# Patient Record
Sex: FEMALE | Race: WHITE | NOT HISPANIC OR LATINO | URBAN - METROPOLITAN AREA
[De-identification: names, ages, dates, MRNs, and addresses within clinical notes are randomized per-mention and may not be internally consistent; named-entity substitution may affect disease eponyms.]

---

## 2017-01-26 ENCOUNTER — OUTPATIENT (OUTPATIENT)
Dept: OUTPATIENT SERVICES | Facility: HOSPITAL | Age: 52
LOS: 1 days | End: 2017-01-26
Payer: COMMERCIAL

## 2017-01-26 DIAGNOSIS — M47.812 SPONDYLOSIS WITHOUT MYELOPATHY OR RADICULOPATHY, CERVICAL REGION: ICD-10-CM

## 2017-01-26 PROCEDURE — 64492 INJ PARAVERT F JNT C/T 3 LEV: CPT | Mod: RT

## 2017-01-26 PROCEDURE — 64490 INJ PARAVERT F JNT C/T 1 LEV: CPT | Mod: RT

## 2017-01-26 PROCEDURE — 64491 INJ PARAVERT F JNT C/T 2 LEV: CPT | Mod: RT

## 2017-01-26 PROCEDURE — 77003 FLUOROGUIDE FOR SPINE INJECT: CPT

## 2017-04-04 ENCOUNTER — OUTPATIENT (OUTPATIENT)
Dept: OUTPATIENT SERVICES | Facility: HOSPITAL | Age: 52
LOS: 1 days | End: 2017-04-04
Payer: COMMERCIAL

## 2017-04-04 DIAGNOSIS — M47.817 SPONDYLOSIS WITHOUT MYELOPATHY OR RADICULOPATHY, LUMBOSACRAL REGION: ICD-10-CM

## 2017-04-04 PROCEDURE — 77003 FLUOROGUIDE FOR SPINE INJECT: CPT

## 2017-04-04 PROCEDURE — 64633 DESTROY CERV/THOR FACET JNT: CPT | Mod: RT

## 2017-04-04 PROCEDURE — 64634 DESTROY C/TH FACET JNT ADDL: CPT | Mod: RT

## 2017-11-06 ENCOUNTER — OUTPATIENT (OUTPATIENT)
Dept: OUTPATIENT SERVICES | Facility: HOSPITAL | Age: 52
LOS: 1 days | End: 2017-11-06
Payer: COMMERCIAL

## 2017-11-06 DIAGNOSIS — M47.812 SPONDYLOSIS WITHOUT MYELOPATHY OR RADICULOPATHY, CERVICAL REGION: ICD-10-CM

## 2017-11-06 PROCEDURE — 77003 FLUOROGUIDE FOR SPINE INJECT: CPT

## 2017-11-06 PROCEDURE — 64633 DESTROY CERV/THOR FACET JNT: CPT | Mod: RT

## 2017-11-06 PROCEDURE — 64634 DESTROY C/TH FACET JNT ADDL: CPT | Mod: RT

## 2017-11-14 ENCOUNTER — OUTPATIENT (OUTPATIENT)
Dept: OUTPATIENT SERVICES | Facility: HOSPITAL | Age: 52
LOS: 1 days | End: 2017-11-14
Payer: COMMERCIAL

## 2017-11-14 DIAGNOSIS — M47.812 SPONDYLOSIS WITHOUT MYELOPATHY OR RADICULOPATHY, CERVICAL REGION: ICD-10-CM

## 2017-11-14 PROCEDURE — 64633 DESTROY CERV/THOR FACET JNT: CPT | Mod: LT

## 2017-11-14 PROCEDURE — 77003 FLUOROGUIDE FOR SPINE INJECT: CPT

## 2017-11-14 PROCEDURE — 64634 DESTROY C/TH FACET JNT ADDL: CPT | Mod: LT

## 2019-04-11 ENCOUNTER — OUTPATIENT (OUTPATIENT)
Dept: OUTPATIENT SERVICES | Facility: HOSPITAL | Age: 54
LOS: 1 days | Discharge: HOME | End: 2019-04-11
Payer: COMMERCIAL

## 2019-04-11 DIAGNOSIS — M47.817 SPONDYLOSIS WITHOUT MYELOPATHY OR RADICULOPATHY, LUMBOSACRAL REGION: ICD-10-CM

## 2019-04-11 PROCEDURE — 64492 INJ PARAVERT F JNT C/T 3 LEV: CPT | Mod: RT

## 2019-04-11 PROCEDURE — 77003 FLUOROGUIDE FOR SPINE INJECT: CPT

## 2019-04-11 PROCEDURE — 64490 INJ PARAVERT F JNT C/T 1 LEV: CPT | Mod: RT

## 2019-04-11 PROCEDURE — 64491 INJ PARAVERT F JNT C/T 2 LEV: CPT | Mod: RT

## 2019-04-18 ENCOUNTER — OUTPATIENT (OUTPATIENT)
Dept: OUTPATIENT SERVICES | Facility: HOSPITAL | Age: 54
LOS: 1 days | End: 2019-04-18
Payer: COMMERCIAL

## 2019-04-18 DIAGNOSIS — M47.817 SPONDYLOSIS WITHOUT MYELOPATHY OR RADICULOPATHY, LUMBOSACRAL REGION: ICD-10-CM

## 2019-04-18 PROCEDURE — 64492 INJ PARAVERT F JNT C/T 3 LEV: CPT | Mod: LT

## 2019-04-18 PROCEDURE — 64490 INJ PARAVERT F JNT C/T 1 LEV: CPT | Mod: LT

## 2019-04-18 PROCEDURE — 77003 FLUOROGUIDE FOR SPINE INJECT: CPT

## 2019-04-18 PROCEDURE — 64491 INJ PARAVERT F JNT C/T 2 LEV: CPT | Mod: LT

## 2019-06-13 ENCOUNTER — OUTPATIENT (OUTPATIENT)
Dept: OUTPATIENT SERVICES | Facility: HOSPITAL | Age: 54
LOS: 1 days | Discharge: HOME | End: 2019-06-13
Payer: COMMERCIAL

## 2019-06-13 DIAGNOSIS — M47.812 SPONDYLOSIS WITHOUT MYELOPATHY OR RADICULOPATHY, CERVICAL REGION: ICD-10-CM

## 2019-06-13 PROCEDURE — 64634 DESTROY C/TH FACET JNT ADDL: CPT | Mod: LT

## 2019-06-13 PROCEDURE — 64633 DESTROY CERV/THOR FACET JNT: CPT | Mod: LT

## 2019-06-13 PROCEDURE — 77003 FLUOROGUIDE FOR SPINE INJECT: CPT

## 2019-06-20 ENCOUNTER — OUTPATIENT (OUTPATIENT)
Dept: OUTPATIENT SERVICES | Facility: HOSPITAL | Age: 54
LOS: 1 days | Discharge: HOME | End: 2019-06-20
Payer: COMMERCIAL

## 2019-06-20 DIAGNOSIS — M47.817 SPONDYLOSIS WITHOUT MYELOPATHY OR RADICULOPATHY, LUMBOSACRAL REGION: ICD-10-CM

## 2019-06-20 DIAGNOSIS — M47.812 SPONDYLOSIS WITHOUT MYELOPATHY OR RADICULOPATHY, CERVICAL REGION: ICD-10-CM

## 2019-06-20 PROCEDURE — 77003 FLUOROGUIDE FOR SPINE INJECT: CPT

## 2019-06-20 PROCEDURE — 64634 DESTROY C/TH FACET JNT ADDL: CPT | Mod: RT

## 2019-06-20 PROCEDURE — 64633 DESTROY CERV/THOR FACET JNT: CPT | Mod: RT

## 2020-05-06 ENCOUNTER — OUTPATIENT (OUTPATIENT)
Dept: OUTPATIENT SERVICES | Facility: HOSPITAL | Age: 55
LOS: 1 days | End: 2020-05-06
Payer: COMMERCIAL

## 2020-05-06 DIAGNOSIS — M47.812 SPONDYLOSIS WITHOUT MYELOPATHY OR RADICULOPATHY, CERVICAL REGION: ICD-10-CM

## 2020-05-06 LAB — SARS-COV-2 RNA SPEC QL NAA+PROBE: SIGNIFICANT CHANGE UP

## 2020-05-06 PROCEDURE — 87635 SARS-COV-2 COVID-19 AMP PRB: CPT

## 2020-05-07 ENCOUNTER — OUTPATIENT (OUTPATIENT)
Dept: OUTPATIENT SERVICES | Facility: HOSPITAL | Age: 55
LOS: 1 days | End: 2020-05-07
Payer: COMMERCIAL

## 2020-05-07 DIAGNOSIS — M47.812 SPONDYLOSIS WITHOUT MYELOPATHY OR RADICULOPATHY, CERVICAL REGION: ICD-10-CM

## 2020-05-07 PROCEDURE — 64634 DESTROY C/TH FACET JNT ADDL: CPT | Mod: LT

## 2020-05-07 PROCEDURE — 64633 DESTROY CERV/THOR FACET JNT: CPT | Mod: LT

## 2020-05-07 PROCEDURE — 77003 FLUOROGUIDE FOR SPINE INJECT: CPT

## 2020-05-08 PROBLEM — Z00.00 ENCOUNTER FOR PREVENTIVE HEALTH EXAMINATION: Status: ACTIVE | Noted: 2020-05-08

## 2020-05-12 ENCOUNTER — APPOINTMENT (OUTPATIENT)
Dept: RHEUMATOLOGY | Facility: CLINIC | Age: 55
End: 2020-05-12
Payer: COMMERCIAL

## 2020-05-12 VITALS
OXYGEN SATURATION: 98 % | BODY MASS INDEX: 20.66 KG/M2 | HEART RATE: 71 BPM | SYSTOLIC BLOOD PRESSURE: 100 MMHG | HEIGHT: 64 IN | TEMPERATURE: 98.4 F | WEIGHT: 121 LBS | DIASTOLIC BLOOD PRESSURE: 68 MMHG

## 2020-05-12 DIAGNOSIS — M47.819 SPONDYLOSIS W/OUT MYELOPATHY OR RADICULOPATHY, SITE UNSPECIFIED: ICD-10-CM

## 2020-05-12 DIAGNOSIS — M79.7 FIBROMYALGIA: ICD-10-CM

## 2020-05-12 DIAGNOSIS — M19.90 UNSPECIFIED OSTEOARTHRITIS, UNSPECIFIED SITE: ICD-10-CM

## 2020-05-12 DIAGNOSIS — Z82.61 FAMILY HISTORY OF ARTHRITIS: ICD-10-CM

## 2020-05-12 PROCEDURE — 99203 OFFICE O/P NEW LOW 30 MIN: CPT | Mod: 25

## 2020-05-12 PROCEDURE — 36415 COLL VENOUS BLD VENIPUNCTURE: CPT

## 2020-05-12 NOTE — HISTORY OF PRESENT ILLNESS
[FreeTextEntry1] : Initial HPI 5/12/20 \par 53 yo wf with long hx chronic pain s/p LS L5-S1 anteriorfusion 2000, and chronic neck issues following PM.  Hx significant for frozen shoulders x 2 in past can't ID any triggering.. \par Currently on gabapentin 800 mg TID, Baclofen 10 mg BID, Valium 5 mg 1-2 day Trazodone 100 mg and MM (CBD in daytime/ THC at HS)... started 3 ys ago.. \par \par At this point overwhelming fatigue\par Sleep:  some difficulty staying asleep and only wakes once...hx ambien for awhile but no longer necessary... better w/ MM... , pain never goes away, functionally able to care for family.. routine stretching/ and walking routinely 30 mins.. able to be active w/ friends/ family.. .. \par Denies depression or anxiety.. and assessment negative \par Would like to not add any medications...\par \par Followed closely by Dr. Liriano x many ys, w/u for RA in past negative, pelvic films with minimal DJD and no evidence of erosive changes.   \par \par Lives with chronic severe muscle spams. \par Past tx:\par Acupuncture, PT \par Opiates not helpful- \par Massage\par \par Obesity:  in past max 210 now at 121 with excellent motion\par Lyrica not effective \par Wellbutrin\par Metaxalone\par Flexeril\par Tizanidine\par no amitripyline, savella,

## 2020-05-12 NOTE — CONSULT LETTER
[Dear  ___] : Dear  [unfilled], [Consult Letter:] : I had the pleasure of evaluating your patient, [unfilled]. [Consult Closing:] : Thank you very much for allowing me to participate in the care of this patient.  If you have any questions, please do not hesitate to contact me. [Sincerely,] : Sincerely, [FreeTextEntry2] : Loretta Cowan DO\par [address] [facility], [address]\[place] [address] \par [phone]\par \par  [FreeTextEntry1] : Please see below for summary of  recent rheumatology evaluation and recommendations.\par \par 53 yo wf with long hx chronic pain s/p LS L5-S1 anteriorfusion 2000, and chronic neck issues following PM- eventually dx w/ FM..\par \par 1) FM:  long standing.. w/ .  Hx significant for frozen shoulders x 2 in past can't ID any triggering.. Followed cy Dr coppola \par Currently on gabapentin 800 mg TID, Baclofen 10 mg BID, Valium 5 mg 1-2 day Trazodone 100 mg and MM (CBD in daytime/ THC at HS)... started 3 ys ago.. Overall wants to minimize medications as much as possible. \par Little on exam to suggest inflammatory process but will do labs/ and SI xrays  \par NOTE: \par - EMG/ NCS + CTS otherwise negative \par - previous labs negative for systemic inflammatory process.. last sepulveda 2018\par Treatments in past\par Lyrica not effective \par Wellbutrin\par Metaxalone\par Flexeril\par Tizanidine\par no amitripyline, savella\par Past tx:\par Acupuncture, PT \par Opiates not helpful- \par Massage\par \par 2) Obesity:  in past max 210 now at 121 with excellent motion\par \par Plan: \par - labs now\par - may consider Cymbalta or other SSRI/ SNRI... for pain issues \par ,  [FreeTextEntry3] : Bridgette Calderon DNP, ANP-C\par Division or Rheumatology\par [facility]\par \par

## 2020-05-12 NOTE — PHYSICAL EXAM
[General Appearance - Alert] : alert [General Appearance - In No Acute Distress] : in no acute distress [General Appearance - Well Nourished] : well nourished [General Appearance - Well-Appearing] : healthy appearing [General Appearance - Well Developed] : well developed [Sclera] : the sclera and conjunctiva were normal [PERRL With Normal Accommodation] : pupils were equal in size, round, and reactive to light [Extraocular Movements] : extraocular movements were intact [Oropharynx] : the oropharynx was normal [Outer Ear] : the ears and nose were normal in appearance [Neck Appearance] : the appearance of the neck was normal [Neck Cervical Mass (___cm)] : no neck mass was observed [Jugular Venous Distention Increased] : there was no jugular-venous distention [Thyroid Diffuse Enlargement] : the thyroid was not enlarged [Thyroid Nodule] : there were no palpable thyroid nodules [Auscultation Breath Sounds / Voice Sounds] : lungs were clear to auscultation bilaterally [Heart Rate And Rhythm] : heart rate was normal and rhythm regular [Heart Sounds] : normal S1 and S2 [Heart Sounds Gallop] : no gallops [Murmurs] : no murmurs [Heart Sounds Pericardial Friction Rub] : no pericardial rub [Full Pulse] : the pedal pulses are present [Bowel Sounds] : normal bowel sounds [Edema] : there was no peripheral edema [Abdomen Soft] : soft [Abdomen Tenderness] : non-tender [Abdomen Mass (___ Cm)] : no abdominal mass palpated [Cervical Lymph Nodes Enlarged Posterior Bilaterally] : posterior cervical [Cervical Lymph Nodes Enlarged Anterior Bilaterally] : anterior cervical [Supraclavicular Lymph Nodes Enlarged Bilaterally] : supraclavicular [Axillary Lymph Nodes Enlarged Bilaterally] : axillary [Femoral Lymph Nodes Enlarged Bilaterally] : femoral [Inguinal Lymph Nodes Enlarged Bilaterally] : inguinal [No CVA Tenderness] : no ~M costovertebral angle tenderness [No Spinal Tenderness] : no spinal tenderness [Abnormal Walk] : normal gait [Nail Clubbing] : no clubbing  or cyanosis of the fingernails [Musculoskeletal - Swelling] : no joint swelling seen [Motor Tone] : muscle strength and tone were normal [] : no rash [Sensation] : the sensory exam was normal to light touch and pinprick [Motor Exam] : the motor exam was normal [FreeTextEntry1] : PSD 21+ for FM, PSD 6 mild , CHALO- 1 no anxiety

## 2020-05-12 NOTE — ASSESSMENT
[FreeTextEntry1] : \par 55 yo wf with long hx chronic pain s/p LS L5-S1 anteriorfusion 2000, and chronic neck issues following PM- eventually dx w/ FM..\par \par 1) FM:  long standing.. w/ .  Hx significant for frozen shoulders x 2 in past can't ID any triggering.. Followed cy Dr coppola \par Currently on gabapentin 800 mg TID, Baclofen 10 mg BID, Valium 5 mg 1-2 day Trazodone 100 mg and MM (CBD in daytime/ THC at HS)... started 3 ys ago.. Overall wants to minimize medications as much as possible. \par Little on exam to suggest inflammatory process but will do labs/ and SI xrays  \par NOTE: \par - EMG/ NCS + CTS otherwise negative \par - previous labs negative for systemic inflammatory process.. last sepulveda 2018\par Treatments in past\par Lyrica not effective \par Wellbutrin\par Metaxalone\par Flexeril\par Tizanidine\par no amitripyline, savella\par Past tx:\par Acupuncture, PT \par Opiates not helpful- \par Massage\par \par 2) Obesity:  in past max 210 now at 121 with excellent motion\par \par Plan: \par - labs now\par - may consider Cymbalta or other SSRI/ SNRI... for pain issues \par - recommend continued current tx.. with no change\par - xray SI jointts and labs now

## 2020-05-12 NOTE — REVIEW OF SYSTEMS
[Feeling Tired] : feeling tired [Diarrhea] : diarrhea [Arthralgias] : arthralgias [Joint Pain] : joint pain [As Noted in HPI] : as noted in HPI [Negative] : Heme/Lymph [FreeTextEntry2] : nearly always fatigued..  [FreeTextEntry9] : no overt joint swelling now or in past.  Known hypermobility x many ys but no joint dislocation

## 2020-05-12 NOTE — REASON FOR VISIT
[Consultation] : a consultation visit [FreeTextEntry1] : Chronic pain x many ys dx FM, here to r/o underlying inflammatory process

## 2020-05-13 LAB
25(OH)D3 SERPL-MCNC: 35.2 NG/ML
ALBUMIN SERPL ELPH-MCNC: 4.6 G/DL
ALP BLD-CCNC: 51 U/L
ALT SERPL-CCNC: 15 U/L
ANION GAP SERPL CALC-SCNC: 15 MMOL/L
AST SERPL-CCNC: 20 U/L
BASOPHILS # BLD AUTO: 0.04 K/UL
BASOPHILS NFR BLD AUTO: 0.7 %
BILIRUB SERPL-MCNC: 0.4 MG/DL
BUN SERPL-MCNC: 11 MG/DL
CALCIUM SERPL-MCNC: 9.5 MG/DL
CALCIUM SERPL-MCNC: 9.5 MG/DL
CHLORIDE SERPL-SCNC: 101 MMOL/L
CK SERPL-CCNC: 44 U/L
CO2 SERPL-SCNC: 28 MMOL/L
CREAT SERPL-MCNC: 0.7 MG/DL
CRP SERPL-MCNC: <0.1 MG/DL
EOSINOPHIL # BLD AUTO: 0.04 K/UL
EOSINOPHIL NFR BLD AUTO: 0.7 %
ERYTHROCYTE [SEDIMENTATION RATE] IN BLOOD BY WESTERGREN METHOD: 20 MM/HR
GLUCOSE SERPL-MCNC: 85 MG/DL
HCT VFR BLD CALC: 43.4 %
HGB BLD-MCNC: 13.8 G/DL
IMM GRANULOCYTES NFR BLD AUTO: 0.2 %
LYMPHOCYTES # BLD AUTO: 2.01 K/UL
LYMPHOCYTES NFR BLD AUTO: 34.5 %
MAN DIFF?: NORMAL
MCHC RBC-ENTMCNC: 29.2 PG
MCHC RBC-ENTMCNC: 31.8 GM/DL
MCV RBC AUTO: 91.8 FL
MONOCYTES # BLD AUTO: 0.48 K/UL
MONOCYTES NFR BLD AUTO: 8.2 %
NEUTROPHILS # BLD AUTO: 3.25 K/UL
NEUTROPHILS NFR BLD AUTO: 55.7 %
PARATHYROID HORMONE INTACT: 24 PG/ML
PLATELET # BLD AUTO: 283 K/UL
POTASSIUM SERPL-SCNC: 3.8 MMOL/L
PROT SERPL-MCNC: 7.6 G/DL
RBC # BLD: 4.73 M/UL
RBC # FLD: 12.6 %
SODIUM SERPL-SCNC: 143 MMOL/L
TSH SERPL-ACNC: 1.71 UIU/ML
WBC # FLD AUTO: 5.83 K/UL

## 2020-07-14 ENCOUNTER — OUTPATIENT (OUTPATIENT)
Dept: OUTPATIENT SERVICES | Facility: HOSPITAL | Age: 55
LOS: 1 days | End: 2020-07-14
Payer: COMMERCIAL

## 2020-07-14 DIAGNOSIS — Z11.59 ENCOUNTER FOR SCREENING FOR OTHER VIRAL DISEASES: ICD-10-CM

## 2020-07-14 PROCEDURE — U0003: CPT

## 2020-07-15 LAB — SARS-COV-2 RNA SPEC QL NAA+PROBE: SIGNIFICANT CHANGE UP

## 2020-07-16 ENCOUNTER — OUTPATIENT (OUTPATIENT)
Dept: OUTPATIENT SERVICES | Facility: HOSPITAL | Age: 55
LOS: 1 days | End: 2020-07-16
Payer: COMMERCIAL

## 2020-07-16 DIAGNOSIS — M47.812 SPONDYLOSIS WITHOUT MYELOPATHY OR RADICULOPATHY, CERVICAL REGION: ICD-10-CM

## 2020-07-16 PROCEDURE — 64634 DESTROY C/TH FACET JNT ADDL: CPT | Mod: RT

## 2020-07-16 PROCEDURE — 77003 FLUOROGUIDE FOR SPINE INJECT: CPT

## 2020-07-16 PROCEDURE — 64633 DESTROY CERV/THOR FACET JNT: CPT | Mod: RT

## 2021-03-03 ENCOUNTER — APPOINTMENT (OUTPATIENT)
Dept: GASTROENTEROLOGY | Facility: CLINIC | Age: 56
End: 2021-03-03
Payer: COMMERCIAL

## 2021-03-03 VITALS
HEIGHT: 64 IN | BODY MASS INDEX: 20.49 KG/M2 | TEMPERATURE: 98 F | DIASTOLIC BLOOD PRESSURE: 93 MMHG | WEIGHT: 120 LBS | SYSTOLIC BLOOD PRESSURE: 128 MMHG | HEART RATE: 102 BPM

## 2021-03-03 DIAGNOSIS — Z87.19 PERSONAL HISTORY OF OTHER DISEASES OF THE DIGESTIVE SYSTEM: ICD-10-CM

## 2021-03-03 DIAGNOSIS — K21.9 GASTRO-ESOPHAGEAL REFLUX DISEASE W/OUT ESOPHAGITIS: ICD-10-CM

## 2021-03-03 PROCEDURE — 99203 OFFICE O/P NEW LOW 30 MIN: CPT

## 2021-03-03 PROCEDURE — 99072 ADDL SUPL MATRL&STAF TM PHE: CPT

## 2021-03-03 RX ORDER — SODIUM PICOSULFATE, MAGNESIUM OXIDE, AND ANHYDROUS CITRIC ACID 10; 3.5; 12 MG/160ML; G/160ML; G/160ML
10-3.5-12 MG-GM LIQUID ORAL
Qty: 1 | Refills: 0 | Status: ACTIVE | COMMUNITY
Start: 2021-03-03 | End: 1900-01-01

## 2021-03-03 NOTE — ASSESSMENT
[FreeTextEntry1] : 56 yo female with history of GERD symptoms and history of IBS with rectal bleeding. Will arrange for colonoscopy and upper endoscopy to evaluate.

## 2021-03-03 NOTE — HISTORY OF PRESENT ILLNESS
[de-identified] : Ms. MARCELLUS KAT is a 55 year old female with history of bloating associated with alternating constipation and diarrhea. Patient notes that bloating is progressive and worse at the end of the day. Patient did have an episode of rectal bleeding 2 weeks ago. Patient never had prior screening. There is no family history of colon cancer. Patient has also noted frequent heartburn for which she has taken multiple courses of proton pump inhibitors. There's been no dysphagia.\par

## 2021-03-30 ENCOUNTER — APPOINTMENT (OUTPATIENT)
Dept: DISASTER EMERGENCY | Facility: CLINIC | Age: 56
End: 2021-03-30

## 2021-03-30 DIAGNOSIS — Z01.818 ENCOUNTER FOR OTHER PREPROCEDURAL EXAMINATION: ICD-10-CM

## 2021-03-31 LAB — SARS-COV-2 N GENE NPH QL NAA+PROBE: NOT DETECTED

## 2021-04-02 ENCOUNTER — RESULT REVIEW (OUTPATIENT)
Age: 56
End: 2021-04-02

## 2021-04-02 ENCOUNTER — APPOINTMENT (OUTPATIENT)
Dept: GASTROENTEROLOGY | Facility: AMBULATORY MEDICAL SERVICES | Age: 56
End: 2021-04-02
Payer: COMMERCIAL

## 2021-04-02 PROCEDURE — 43239 EGD BIOPSY SINGLE/MULTIPLE: CPT

## 2021-04-02 PROCEDURE — 45385 COLONOSCOPY W/LESION REMOVAL: CPT

## 2021-12-21 ENCOUNTER — OUTPATIENT (OUTPATIENT)
Dept: OUTPATIENT SERVICES | Facility: HOSPITAL | Age: 56
LOS: 1 days | End: 2021-12-21
Payer: COMMERCIAL

## 2021-12-21 DIAGNOSIS — Z20.828 CONTACT WITH AND (SUSPECTED) EXPOSURE TO OTHER VIRAL COMMUNICABLE DISEASES: ICD-10-CM

## 2021-12-21 LAB — SARS-COV-2 RNA SPEC QL NAA+PROBE: SIGNIFICANT CHANGE UP

## 2021-12-21 PROCEDURE — U0005: CPT

## 2021-12-21 PROCEDURE — U0003: CPT

## 2021-12-23 ENCOUNTER — OUTPATIENT (OUTPATIENT)
Dept: OUTPATIENT SERVICES | Facility: HOSPITAL | Age: 56
LOS: 1 days | End: 2021-12-23
Payer: COMMERCIAL

## 2021-12-23 DIAGNOSIS — M54.14 RADICULOPATHY, THORACIC REGION: ICD-10-CM

## 2021-12-23 PROCEDURE — 64480 NJX AA&/STRD TFRM EPI C/T EA: CPT | Mod: RT

## 2021-12-23 PROCEDURE — 64479 NJX AA&/STRD TFRM EPI C/T 1: CPT | Mod: RT

## 2022-05-31 ENCOUNTER — OUTPATIENT (OUTPATIENT)
Dept: OUTPATIENT SERVICES | Facility: HOSPITAL | Age: 57
LOS: 1 days | End: 2022-05-31
Payer: COMMERCIAL

## 2022-05-31 DIAGNOSIS — Z20.828 CONTACT WITH AND (SUSPECTED) EXPOSURE TO OTHER VIRAL COMMUNICABLE DISEASES: ICD-10-CM

## 2022-05-31 LAB — SARS-COV-2 RNA SPEC QL NAA+PROBE: SIGNIFICANT CHANGE UP

## 2022-05-31 PROCEDURE — U0005: CPT

## 2022-05-31 PROCEDURE — U0003: CPT

## 2022-06-02 ENCOUNTER — OUTPATIENT (OUTPATIENT)
Dept: OUTPATIENT SERVICES | Facility: HOSPITAL | Age: 57
LOS: 1 days | End: 2022-06-02
Payer: COMMERCIAL

## 2022-06-02 DIAGNOSIS — M47.812 SPONDYLOSIS WITHOUT MYELOPATHY OR RADICULOPATHY, CERVICAL REGION: ICD-10-CM

## 2022-06-02 PROCEDURE — 64490 INJ PARAVERT F JNT C/T 1 LEV: CPT | Mod: LT

## 2022-06-02 PROCEDURE — 64491 INJ PARAVERT F JNT C/T 2 LEV: CPT | Mod: LT

## 2022-06-02 PROCEDURE — 64492 INJ PARAVERT F JNT C/T 3 LEV: CPT | Mod: LT

## 2022-06-10 ENCOUNTER — OUTPATIENT (OUTPATIENT)
Dept: OUTPATIENT SERVICES | Facility: HOSPITAL | Age: 57
LOS: 1 days | End: 2022-06-10
Payer: COMMERCIAL

## 2022-06-10 DIAGNOSIS — Z20.828 CONTACT WITH AND (SUSPECTED) EXPOSURE TO OTHER VIRAL COMMUNICABLE DISEASES: ICD-10-CM

## 2022-06-10 LAB — SARS-COV-2 RNA SPEC QL NAA+PROBE: SIGNIFICANT CHANGE UP

## 2022-06-10 PROCEDURE — U0005: CPT

## 2022-06-10 PROCEDURE — U0003: CPT

## 2022-06-13 ENCOUNTER — OUTPATIENT (OUTPATIENT)
Dept: OUTPATIENT SERVICES | Facility: HOSPITAL | Age: 57
LOS: 1 days | End: 2022-06-13
Payer: COMMERCIAL

## 2022-06-13 DIAGNOSIS — M47.812 SPONDYLOSIS WITHOUT MYELOPATHY OR RADICULOPATHY, CERVICAL REGION: ICD-10-CM

## 2022-06-13 PROCEDURE — 64492 INJ PARAVERT F JNT C/T 3 LEV: CPT | Mod: RT

## 2022-06-13 PROCEDURE — 64490 INJ PARAVERT F JNT C/T 1 LEV: CPT | Mod: RT

## 2022-06-13 PROCEDURE — 64491 INJ PARAVERT F JNT C/T 2 LEV: CPT | Mod: RT

## 2022-11-07 VITALS — BODY MASS INDEX: 20.49 KG/M2 | HEIGHT: 64 IN | WEIGHT: 120 LBS

## 2022-11-23 ENCOUNTER — TRANSCRIPTION ENCOUNTER (OUTPATIENT)
Age: 57
End: 2022-11-23

## 2022-11-23 NOTE — ASU DISCHARGE PLAN (ADULT/PEDIATRIC) - NS MD DC FALL RISK RISK
For information on Fall & Injury Prevention, visit: [email or web address] OR  [email or web address] OR  https://www.cdc.gov/steadi/patient.html

## 2022-11-23 NOTE — ASU PATIENT PROFILE, ADULT - FALL HARM RISK - UNIVERSAL INTERVENTIONS
Bed in lowest position, wheels locked, appropriate side rails in place/Call bell, personal items and telephone in reach/Instruct patient to call for assistance before getting out of bed or chair/Non-slip footwear when patient is out of bed/[place] to call system/Physically safe environment - no spills, clutter or unnecessary equipment/Purposeful Proactive Rounding/Room/bathroom lighting operational, light cord in reach

## 2022-11-23 NOTE — ASU PATIENT PROFILE, ADULT - NSICDXPASTSURGICALHX_GEN_ALL_CORE_FT
PAST SURGICAL HISTORY:  H/O: hysterectomy partial    History of breast surgery     History of tonsillectomy

## 2022-11-25 ENCOUNTER — OUTPATIENT (OUTPATIENT)
Dept: OUTPATIENT SERVICES | Facility: HOSPITAL | Age: 57
LOS: 1 days | End: 2022-11-25
Payer: COMMERCIAL

## 2022-11-25 DIAGNOSIS — Z90.89 ACQUIRED ABSENCE OF OTHER ORGANS: Chronic | ICD-10-CM

## 2022-11-25 DIAGNOSIS — Z20.828 CONTACT WITH AND (SUSPECTED) EXPOSURE TO OTHER VIRAL COMMUNICABLE DISEASES: ICD-10-CM

## 2022-11-25 DIAGNOSIS — Z98.890 OTHER SPECIFIED POSTPROCEDURAL STATES: Chronic | ICD-10-CM

## 2022-11-25 DIAGNOSIS — Z90.710 ACQUIRED ABSENCE OF BOTH CERVIX AND UTERUS: Chronic | ICD-10-CM

## 2022-11-25 LAB — SARS-COV-2 RNA SPEC QL NAA+PROBE: SIGNIFICANT CHANGE UP

## 2022-11-25 PROCEDURE — U0005: CPT

## 2022-11-25 PROCEDURE — U0003: CPT

## 2022-11-28 ENCOUNTER — OUTPATIENT (OUTPATIENT)
Dept: OUTPATIENT SERVICES | Facility: HOSPITAL | Age: 57
LOS: 1 days | End: 2022-11-28
Payer: COMMERCIAL

## 2022-11-28 VITALS
DIASTOLIC BLOOD PRESSURE: 69 MMHG | TEMPERATURE: 98 F | SYSTOLIC BLOOD PRESSURE: 122 MMHG | HEART RATE: 71 BPM | OXYGEN SATURATION: 100 % | RESPIRATION RATE: 10 BRPM | WEIGHT: 117.07 LBS | HEIGHT: 64 IN

## 2022-11-28 VITALS
OXYGEN SATURATION: 99 % | DIASTOLIC BLOOD PRESSURE: 89 MMHG | HEART RATE: 68 BPM | TEMPERATURE: 98 F | SYSTOLIC BLOOD PRESSURE: 106 MMHG | RESPIRATION RATE: 18 BRPM

## 2022-11-28 DIAGNOSIS — Z90.710 ACQUIRED ABSENCE OF BOTH CERVIX AND UTERUS: Chronic | ICD-10-CM

## 2022-11-28 DIAGNOSIS — Z98.890 OTHER SPECIFIED POSTPROCEDURAL STATES: Chronic | ICD-10-CM

## 2022-11-28 DIAGNOSIS — Z90.89 ACQUIRED ABSENCE OF OTHER ORGANS: Chronic | ICD-10-CM

## 2022-11-28 DIAGNOSIS — M47.812 SPONDYLOSIS WITHOUT MYELOPATHY OR RADICULOPATHY, CERVICAL REGION: ICD-10-CM

## 2022-11-28 PROCEDURE — 64634 DESTROY C/TH FACET JNT ADDL: CPT | Mod: RT

## 2022-11-28 PROCEDURE — 64633 DESTROY CERV/THOR FACET JNT: CPT | Mod: RT

## 2022-12-13 PROBLEM — R01.1 CARDIAC MURMUR, UNSPECIFIED: Chronic | Status: ACTIVE | Noted: 2022-11-28

## 2022-12-15 ENCOUNTER — TRANSCRIPTION ENCOUNTER (OUTPATIENT)
Age: 57
End: 2022-12-15

## 2022-12-15 NOTE — ASU PATIENT PROFILE, ADULT - NSICDXPASTSURGICALHX_GEN_ALL_CORE_FT
PAST SURGICAL HISTORY:  H/O Spinal surgery     H/O: hysterectomy partial    History of breast surgery     History of tonsillectomy

## 2022-12-15 NOTE — ASU PATIENT PROFILE, ADULT - INTERNATIONAL TRAVEL
No [FreeTextEntry1] : Dr. Eloisa casey.\par He came in person for his 1st appt with me. Spoke a lot about his family situation, his feelings about being cheated on by his ex wife. Pt is sincere and open. Says that he feels better. Still feels ashamed to tell his family in [place]. He has no relatives here. He started working and making money. He tolerates it well. Sleeps better. Takes meds only when he can't sleep. Education and supportive therapy were provided and compliance was encouraged. Denies SI/HI/AH/PI. Denies THERESE. Says that he still loves his ex. Spoke a lot about his relationships with her. Denies side effects of meds. However, c/o tension HA, which started in January (he broke up with his wife in January and also his meds were started in January). I told him to call his PCP for this tension HA. Pt works as a  (drives from [place] to [place]).\par \par \par Pt is a 30 yo  male originally from [facility] referred from [facility] where he presented due to poor sleep, severe anxiety, intrusive thoughts. dysphoria and transient suicidal thoughts which began in January after he discovered his wife of 2 years was cheating on him and threw him out of their home in [place].  He is currently staying with friends in [place], works as a   (drives to [place]). States that he feels very ashamed about his marital situation and has not told his family in [place] about it. He reports some episodes of anxiety with SOB, palpitations, headaches.He reports that he frequently thinks about the day his wife and new boyfriend ejected him from his home.\par Pt has no h/o IPP, no SA, no THERESE. Pt is applying for green card or citizenship. He has an . Pt has a job permit at this time.\par \par Pt is at elevated chronic suicide risk given his recent marital issues, current mood symptoms and anxiety. He is not an acute risk at present and contracts for safety.  Risk is mitigated by responsibility to family in [place], his Gnosticism beliefs and supportive friends. Friends are providing supervision and monitoring.\par Patient reports partial improvement in symptoms since last viisit but mood is still dysphoric and he has episodes of increased anxiety.  He remains under stress regarding his immigration status. \par 1. Next appt in 1m in person\par 2. Does he still have a HA (tension?). Does he need to see a PCP\par 3. Can I decrease zyprexa (I don't understand why he was started on zyprexa. I need more information-any voices, etc)\par 4. Did he hear from his ex wife\par 5. Is he still leaving with his friends\par 6. Does he need labs\par 7. Encourage therapy (at least once a month)\par \par Plan:  increase  escitalopram 20 mg po 24h\par           decrease  olanzapine to 5  mg po qhs. \par            education regarding use of medication\par \par            \par \par

## 2022-12-16 ENCOUNTER — OUTPATIENT (OUTPATIENT)
Dept: OUTPATIENT SERVICES | Facility: HOSPITAL | Age: 57
LOS: 1 days | End: 2022-12-16
Payer: COMMERCIAL

## 2022-12-16 DIAGNOSIS — Z98.890 OTHER SPECIFIED POSTPROCEDURAL STATES: Chronic | ICD-10-CM

## 2022-12-16 DIAGNOSIS — Z90.710 ACQUIRED ABSENCE OF BOTH CERVIX AND UTERUS: Chronic | ICD-10-CM

## 2022-12-16 DIAGNOSIS — Z90.89 ACQUIRED ABSENCE OF OTHER ORGANS: Chronic | ICD-10-CM

## 2022-12-16 DIAGNOSIS — Z20.828 CONTACT WITH AND (SUSPECTED) EXPOSURE TO OTHER VIRAL COMMUNICABLE DISEASES: ICD-10-CM

## 2022-12-16 LAB — SARS-COV-2 RNA SPEC QL NAA+PROBE: SIGNIFICANT CHANGE UP

## 2022-12-16 PROCEDURE — U0005: CPT

## 2022-12-16 PROCEDURE — U0003: CPT

## 2022-12-19 ENCOUNTER — OUTPATIENT (OUTPATIENT)
Dept: OUTPATIENT SERVICES | Facility: HOSPITAL | Age: 57
LOS: 1 days | End: 2022-12-19
Payer: COMMERCIAL

## 2022-12-19 VITALS
SYSTOLIC BLOOD PRESSURE: 119 MMHG | RESPIRATION RATE: 17 BRPM | OXYGEN SATURATION: 99 % | DIASTOLIC BLOOD PRESSURE: 73 MMHG | HEART RATE: 74 BPM

## 2022-12-19 VITALS
DIASTOLIC BLOOD PRESSURE: 70 MMHG | OXYGEN SATURATION: 100 % | HEART RATE: 68 BPM | TEMPERATURE: 98 F | HEIGHT: 64 IN | RESPIRATION RATE: 17 BRPM | WEIGHT: 257.94 LBS | SYSTOLIC BLOOD PRESSURE: 137 MMHG

## 2022-12-19 DIAGNOSIS — M47.812 SPONDYLOSIS WITHOUT MYELOPATHY OR RADICULOPATHY, CERVICAL REGION: ICD-10-CM

## 2022-12-19 DIAGNOSIS — Z90.710 ACQUIRED ABSENCE OF BOTH CERVIX AND UTERUS: Chronic | ICD-10-CM

## 2022-12-19 DIAGNOSIS — Z98.890 OTHER SPECIFIED POSTPROCEDURAL STATES: Chronic | ICD-10-CM

## 2022-12-19 DIAGNOSIS — Z90.89 ACQUIRED ABSENCE OF OTHER ORGANS: Chronic | ICD-10-CM

## 2022-12-19 PROCEDURE — 64633 DESTROY CERV/THOR FACET JNT: CPT | Mod: LT

## 2022-12-19 PROCEDURE — 64634 DESTROY C/TH FACET JNT ADDL: CPT | Mod: LT

## 2022-12-19 RX ORDER — SALIVA SUBSTITUTE COMB NO.11 351 MG
15 POWDER IN PACKET (EA) MUCOUS MEMBRANE
Qty: 0 | Refills: 0 | DISCHARGE

## 2022-12-19 NOTE — ASU PREOP CHECKLIST - HAIR REMOVAL
hair removal not indicated Dr. Machado (Attending Physician)   85 y/o M with PMH of HTN, HLD, Afib on coumadin, CHF, DM presents to ED sent in by vascular (Dr. Tato Quinn) c/o  3rd/4th R toe pain and ulceration/discoloration. extremity warm/well perfused. suspect PAD, low suspicion necrotizing fasciitis given minimal erythema, low suspicion OM but possible given immunocompromised and open wound. toe does not appear consistent with gangrene (wet or dry). low suspicion ischemic foot given foot appears warm/well perfused. plan for vascular/podiatry consult, doppler for b/l pulses, basic labs, xray.

## 2023-01-06 PROBLEM — M19.90 UNSPECIFIED OSTEOARTHRITIS, UNSPECIFIED SITE: Chronic | Status: ACTIVE | Noted: 2022-12-19

## 2023-01-06 PROBLEM — M79.7 FIBROMYALGIA: Chronic | Status: ACTIVE | Noted: 2022-12-19

## 2023-01-06 PROBLEM — K21.9 GASTRO-ESOPHAGEAL REFLUX DISEASE WITHOUT ESOPHAGITIS: Chronic | Status: ACTIVE | Noted: 2022-12-19

## 2023-01-09 ENCOUNTER — NON-APPOINTMENT (OUTPATIENT)
Age: 58
End: 2023-01-09

## 2023-01-09 DIAGNOSIS — M54.14 RADICULOPATHY, THORACIC REGION: ICD-10-CM

## 2023-01-09 DIAGNOSIS — M75.00 ADHESIVE CAPSULITIS OF UNSPECIFIED SHOULDER: ICD-10-CM

## 2023-01-09 DIAGNOSIS — Z87.39 PERSONAL HISTORY OF OTHER DISEASES OF THE MUSCULOSKELETAL SYSTEM AND CONNECTIVE TISSUE: ICD-10-CM

## 2023-01-09 DIAGNOSIS — Z86.79 PERSONAL HISTORY OF OTHER DISEASES OF THE CIRCULATORY SYSTEM: ICD-10-CM

## 2023-01-09 DIAGNOSIS — G43.909 MIGRAINE, UNSPECIFIED, NOT INTRACTABLE, W/OUT STATUS MIGRAINOSUS: ICD-10-CM

## 2023-01-09 DIAGNOSIS — Z56.0 UNEMPLOYMENT, UNSPECIFIED: ICD-10-CM

## 2023-01-09 SDOH — ECONOMIC STABILITY - INCOME SECURITY: UNEMPLOYMENT, UNSPECIFIED: Z56.0

## 2023-01-10 ENCOUNTER — APPOINTMENT (OUTPATIENT)
Dept: PAIN MANAGEMENT | Facility: CLINIC | Age: 58
End: 2023-01-10
Payer: COMMERCIAL

## 2023-01-10 VITALS — HEIGHT: 64 IN | WEIGHT: 115 LBS | BODY MASS INDEX: 19.63 KG/M2

## 2023-01-10 PROCEDURE — 99213 OFFICE O/P EST LOW 20 MIN: CPT | Mod: 95

## 2023-01-10 NOTE — ASSESSMENT
[FreeTextEntry1] : Interim history\par The patient is tolerating their medications without problems. There has no new pains, injuries, or complaints and no new issues. The use of medications appears appropriate and there are no aberrant behaviors noted. Side effects to current medications are denied. Average pain score for the month is 6 out of ten. The patient's current medications are documented to the best of their ability. THe  was obtained and reviewed prior to the visit, and any discrepancies were discussed with the patient.\par Objective information\par Since the last visit there are no additional radiologic studies, labs, or pain complaints. There are no changes in the patient's physical status.\par Plan\par The patient was given refill of their medication at their current level and will return to the office as needed for follow-up. The patient is showing no aberrant behavior or evidence of diversion. Opioid contract and opioid risk assessment on chart.  reviewed and any discrepancy discussed with patent. Applicable urine toxicology reviewed and recorded in the patient's electronic record. Urine toxicology is ordered for patient per office protocol or patient's risk assessment.  Patient will follow-up in 1 month unless new issues arise the patient has returned earlier.\par

## 2023-01-10 NOTE — HISTORY OF PRESENT ILLNESS
[Neck] : neck [Mid-back] : mid-back [3] : 3 [7] : 7 [Burning] : burning [Diffuse] : diffuse [Shooting] : shooting [Stabbing] : stabbing [Throbbing] : throbbing [Frequent] : frequent [Household chores] : household chores [Leisure] : leisure [Work] : work [Sleep] : sleep [Rest] : rest [Meds] : meds [Heat] : heat [Bending forward] : bending forward [Not working due to injury] : Work status: not working due to injury [Home] : at home, [unfilled] , at the time of the visit. [Medical Office: ([facility])___] : at the medical office located in  [Verbal consent obtained from patient] : the patient, [unfilled] [] : no [FreeTextEntry9] : S [de-identified] : MRI

## 2023-02-08 ENCOUNTER — NON-APPOINTMENT (OUTPATIENT)
Age: 58
End: 2023-02-08

## 2023-02-08 DIAGNOSIS — Z87.19 PERSONAL HISTORY OF OTHER DISEASES OF THE DIGESTIVE SYSTEM: ICD-10-CM

## 2023-02-08 DIAGNOSIS — Z78.9 OTHER SPECIFIED HEALTH STATUS: ICD-10-CM

## 2023-02-13 ENCOUNTER — APPOINTMENT (OUTPATIENT)
Dept: PAIN MANAGEMENT | Facility: CLINIC | Age: 58
End: 2023-02-13
Payer: COMMERCIAL

## 2023-02-13 VITALS — BODY MASS INDEX: 19.63 KG/M2 | WEIGHT: 115 LBS | HEIGHT: 64 IN

## 2023-02-13 PROCEDURE — 99213 OFFICE O/P EST LOW 20 MIN: CPT | Mod: 95

## 2023-02-13 NOTE — HISTORY OF PRESENT ILLNESS
[Lower back] : lower back [7] : 7 [Constant] : constant [Household chores] : household chores [Rest] : rest [Walking] : walking [Home] : at home, [unfilled] , at the time of the visit. [Medical Office: ([facility])___] : at the medical office located in  [Verbal consent obtained from patient] : the patient, [unfilled] [] : Post Surgical Visit: no [FreeTextEntry6] : SPASMA

## 2023-03-29 ENCOUNTER — APPOINTMENT (OUTPATIENT)
Dept: PAIN MANAGEMENT | Facility: CLINIC | Age: 58
End: 2023-03-29
Payer: COMMERCIAL

## 2023-03-29 VITALS — BODY MASS INDEX: 19.46 KG/M2 | WEIGHT: 114 LBS | HEIGHT: 64 IN

## 2023-03-29 PROCEDURE — 99213 OFFICE O/P EST LOW 20 MIN: CPT

## 2023-03-29 NOTE — HISTORY OF PRESENT ILLNESS
[Lower back] : lower back [7] : 7 [Constant] : constant [Household chores] : household chores [Rest] : rest [Walking] : walking [de-identified] : 03/29/2023- PATIENT STATES COMPLETED PHYSICAL THERAPY IN THE PAST AS PER PT DONT KNOW DATE,  3X A WEEK  AND REPORT'S WORSEN  IMPROVEMENT WITH PAIN.\par PT STATES CURRENTLY COMPLETES HOME STRETCHING PROGRAM AT HOME 4X A WEEK  AND REPORT'S MILD IMPROVEMENT WITH PAIN.  \par  [] : Post Surgical Visit: no [FreeTextEntry6] : SPASMA [de-identified] : UNKN [de-identified] : MRI  10/18/2022

## 2023-04-26 ENCOUNTER — APPOINTMENT (OUTPATIENT)
Dept: PAIN MANAGEMENT | Facility: CLINIC | Age: 58
End: 2023-04-26
Payer: COMMERCIAL

## 2023-04-26 PROCEDURE — 99213 OFFICE O/P EST LOW 20 MIN: CPT | Mod: 95

## 2023-04-26 NOTE — HISTORY OF PRESENT ILLNESS
[Lower back] : lower back [7] : 7 [Constant] : constant [Household chores] : household chores [Rest] : rest [Walking] : walking [Home] : at home, [unfilled] , at the time of the visit. [Medical Office: ([facility])___] : at the medical office located in  [Verbal consent obtained from patient] : the patient, [unfilled] [de-identified] : 03/29/2023- PATIENT STATES COMPLETED PHYSICAL THERAPY IN THE PAST AS PER PT DONT KNOW DATE,  3X A WEEK  AND REPORT'S WORSEN  IMPROVEMENT WITH PAIN.\par PT STATES CURRENTLY COMPLETES HOME STRETCHING PROGRAM AT HOME 4X A WEEK  AND REPORT'S MILD IMPROVEMENT WITH PAIN.  \par  [] : Post Surgical Visit: no [de-identified] : UNKN [FreeTextEntry6] : SPASMA [de-identified] : MRI  10/18/2022

## 2023-04-26 NOTE — REASON FOR VISIT
[FreeTextEntry2] :  PT IS BEING SEEN FOR A FOLLOW-UP VIRTUALLY  PAIN MANAGEMENT VISIT  FOR MED REFILL

## 2023-05-10 ENCOUNTER — RX RENEWAL (OUTPATIENT)
Age: 58
End: 2023-05-10

## 2023-05-24 ENCOUNTER — APPOINTMENT (OUTPATIENT)
Dept: PAIN MANAGEMENT | Facility: CLINIC | Age: 58
End: 2023-05-24
Payer: COMMERCIAL

## 2023-05-24 PROCEDURE — 99213 OFFICE O/P EST LOW 20 MIN: CPT | Mod: 95

## 2023-05-24 NOTE — HISTORY OF PRESENT ILLNESS
[Lower back] : lower back [7] : 7 [Constant] : constant [Household chores] : household chores [Rest] : rest [Walking] : walking [Home] : at home, [unfilled] , at the time of the visit. [Medical Office: ([facility])___] : at the medical office located in  [Verbal consent obtained from patient] : the patient, [unfilled] [] : Post Surgical Visit: no [FreeTextEntry6] : SPASMA [de-identified] : UNKN [de-identified] : MRI  10/18/2022 [de-identified] : 03/29/2023- PATIENT STATES COMPLETED PHYSICAL THERAPY IN THE PAST AS PER PT DONT KNOW DATE,  3X A WEEK  AND REPORT'S WORSEN  IMPROVEMENT WITH PAIN.\par PT STATES CURRENTLY COMPLETES HOME STRETCHING PROGRAM AT HOME 4X A WEEK  AND REPORT'S MILD IMPROVEMENT WITH PAIN.  \par

## 2023-05-24 NOTE — ASSESSMENT
[FreeTextEntry1] : Interim history\par he patient returns with pain that has been treated adequately in the past with epidural steroid injections.  Patient's ADL's increase with prior FJI.   The last injection gave greater than 60% reduction of the pain but now there is a return of symptoms. The patient is requesting a subsequent epidural steroid injection to alleviate pain and improve functional ability and quality of life.  Patient is a candidate.\par Objective findings\par Since the last visit, there have been no new imaging studies. THe patient has no new symptoms except the return of the their pain complaints. Motor and sensory function is unchanged.\par Plan\par Prior to any facet joint facet joint steroid injection patient will obtain new MRI of the lumbar spine.  spoke to patient about epidural steroid injections. Explained risks, benefits and alternatives including but not limited to the risk of infection, bleeding, headache, syncopal episode, failure to resolve issues, allergic reaction, symptom recurrence, allergic reaction, nerve injury, and increased pain. The patient understands the risks. All questions were answered. The patient is willing to proceed.\par

## 2023-06-20 ENCOUNTER — APPOINTMENT (OUTPATIENT)
Dept: PAIN MANAGEMENT | Facility: CLINIC | Age: 58
End: 2023-06-20
Payer: COMMERCIAL

## 2023-06-20 VITALS — WEIGHT: 114 LBS | HEIGHT: 64 IN | BODY MASS INDEX: 19.46 KG/M2

## 2023-06-20 PROCEDURE — 99213 OFFICE O/P EST LOW 20 MIN: CPT

## 2023-06-20 NOTE — ASSESSMENT
[FreeTextEntry1] : Alert and oriented X 3. Slouched posture. Lumbar MRI reviewed. Cervical ROM decreased, numbness to last three fingers on each hand bilaterally.

## 2023-06-20 NOTE — DISCUSSION/SUMMARY
[Medication Risks Reviewed] : Medication risks reviewed [de-identified] : Prescriptions renewed. Opioid agreement/obtained [place] chart [place]  reviewed and appropriate. SOAPP-R completed on chart. The patient's medications are documented to the best of their ability. Quality of life and functional ability improved on medications. The patient is showing no aberrant behavior or evidence of diversion. The patient was advised not to use narcotic medication while operating an automobile or heavy machinery due to potential sedation or dizziness. The patient was educated to the risks associated with potential opioid dependence and addiction. Urine toxicology screens as per office protocol. Use of multimodal analgesia used prn.\par Follow up one month.\par

## 2023-06-20 NOTE — HISTORY OF PRESENT ILLNESS
[Neck] : neck [Lower back] : lower back [Gradual] : gradual [6] : 6 [Dull/Aching] : dull/aching [Radiating] : radiating [Sharp] : sharp [Constant] : constant [Household chores] : household chores [Rest] : rest [Walking] : walking [FreeTextEntry1] : States her back pain exacerbated recently and attributes to watching her grandson's. Lumbar MRI reviewed and no significant changes. States her neck pain and numbness to her hands bothersome and considering a  Cervical facet /RFL. Last Cervical RFL left  12- and the right -11-. Valium and Trazodone prn effective for her.  [] : Post Surgical Visit: no [FreeTextEntry6] : SPASMS ON RIGHT SIDE  [FreeTextEntry7] : SHOULDER BLADE TO WAIST ON RIGHT SIDE  [de-identified] : UNKN [de-identified] : MRI 2023-06-06 [de-identified] : PT CONTINUES WITH HOME STRETCHING DAILY

## 2023-07-10 ENCOUNTER — RX RENEWAL (OUTPATIENT)
Age: 58
End: 2023-07-10

## 2023-07-19 ENCOUNTER — APPOINTMENT (OUTPATIENT)
Dept: PAIN MANAGEMENT | Facility: CLINIC | Age: 58
End: 2023-07-19
Payer: COMMERCIAL

## 2023-07-19 VITALS — BODY MASS INDEX: 19.46 KG/M2 | HEIGHT: 64 IN | WEIGHT: 114 LBS

## 2023-07-19 PROCEDURE — 99214 OFFICE O/P EST MOD 30 MIN: CPT | Mod: 95

## 2023-07-19 NOTE — REASON FOR VISIT
[Home] : at home, [unfilled] , at the time of the visit. [Medical Office: ([facility])___] : at the medical office located in  [Patient] : the patient [Self] : self [FreeTextEntry2] : MED REFILL and schedule Cervical RFL (Right then Left)

## 2023-07-19 NOTE — HISTORY OF PRESENT ILLNESS
[Neck] : neck [Gradual] : gradual [7] : 7 [6] : 6 [Dull/Aching] : dull/aching [Radiating] : radiating [Sharp] : sharp [Tightness] : tightness [Constant] : constant [Household chores] : household chores [Work] : work [Rest] : rest [Meds] : meds [Heat] : heat [Injection therapy] : injection therapy [Sitting] : sitting [Standing] : standing [Walking] : walking [Bending forward] : bending forward [Physical therapy] : physical therapy [Not working due to injury] : Work status: not working due to injury [de-identified] : States she is ready  to schedule  Cervical RFL. Notes  she has been experiencing increased frequency  of headaches. Last Cervical RFL right 11- and the Left 12-. States her cervical pain and  headaches has  been better controlled after the procedures until recently.  [] : Post Surgical Visit: no [FreeTextEntry6] : HEADACHES ,PRESSURE  [FreeTextEntry7] : FROM RIGHT SHOULDER UP TO NECK  [FreeTextEntry9] : NSAIDS  [de-identified] : PROLONGED , LIFTING , TURNING HEAD  [de-identified] : STRETCHES , TENDENCY TO GET FROZEN SHOULDER, STRETCHES HELP LOOSEN MUSCLES DAILY

## 2023-07-19 NOTE — DISCUSSION/SUMMARY
[Medication Risks Reviewed] : Medication risks reviewed [de-identified] : Prescriptions renewed. Opioid [place]obt[place] and appropriate. SOAPP-R completed on chart. The patient's medications are documented to the best of their ability. Quality of life and functional ability improved on medications. The patient is showing no aberrant behavior or evidence of diversion. The patient was advised not to use narcotic medication while operating an automobile or heavy machinery due to potential sedation or dizziness. The patient was educated to the risks associated with potential opioid dependence and addiction. Urine toxicology screens as per office protocol. Use of multimodal analgesia used prn.\par This is a formal request for a Right then Left Cervical RFL C3/4,C4/5 and C5/6. She has obtained greater than 60% sustained pain relief since  last procedures over 6 months ago. \par Spoke to patient about interventional options. Explained risks, benefits and alternatives including but not limited to the risk of infection, bleeding,spinal headache, nerve injury and increased pain. States understanding of above and willing to proceed.\par Follow up one month.\par

## 2023-07-25 ENCOUNTER — TRANSCRIPTION ENCOUNTER (OUTPATIENT)
Age: 58
End: 2023-07-25

## 2023-07-25 NOTE — ASU PATIENT PROFILE, ADULT - NSICDXPASTMEDICALHX_GEN_ALL_CORE_FT
PAST MEDICAL HISTORY:  DA (degenerative arthritis)     Fibromyalgia     GERD (gastroesophageal reflux disease)     Heart murmur      PAST MEDICAL HISTORY:  DA (degenerative arthritis)     Fibromyalgia     GERD (gastroesophageal reflux disease)     Heart murmur     Migraine

## 2023-07-25 NOTE — ASU DISCHARGE PLAN (ADULT/PEDIATRIC) - NS MD DC FALL RISK RISK
For information on Fall & Injury Prevention, visit: [email or web address]://www.[facility]/news/fall-prevention-protects-and-maintains-health-[email or web address]-mobility OR  https://www.[facility]/news/fall-prevention-tips-to-avoid-injury OR  https://www.cdc.gov/steadi/patient.html

## 2023-07-25 NOTE — ASU PATIENT PROFILE, ADULT - FALL HARM RISK - UNIVERSAL INTERVENTIONS
Bed in lowest position, wheels locked, appropriate side rails in place/Call bell, personal items and telephone in reach/Instruct patient to call for assistance before getting out of bed or chair/Non-slip footwear when patient is out of bed/[place] to call system/Physically safe environment - no spills, clutter or unnecessary equipment/Purposeful Proactive Rounding/Room/bathroom lighting operational, light cord in reach

## 2023-07-25 NOTE — ASU DISCHARGE PLAN (ADULT/PEDIATRIC) - PATIENT EDUCATION MATERIALS PROVIED
Refer to Dr. Calderon Pain Management Discharge Instruction Sheet/Provider pre-printed instructions given

## 2023-07-27 ENCOUNTER — APPOINTMENT (OUTPATIENT)
Dept: ORTHOPEDIC SURGERY | Facility: HOSPITAL | Age: 58
End: 2023-07-27
Payer: COMMERCIAL

## 2023-07-27 ENCOUNTER — OUTPATIENT (OUTPATIENT)
Dept: OUTPATIENT SERVICES | Facility: HOSPITAL | Age: 58
LOS: 1 days | End: 2023-07-27
Payer: COMMERCIAL

## 2023-07-27 VITALS
OXYGEN SATURATION: 100 % | HEART RATE: 69 BPM | RESPIRATION RATE: 17 BRPM | DIASTOLIC BLOOD PRESSURE: 68 MMHG | SYSTOLIC BLOOD PRESSURE: 125 MMHG

## 2023-07-27 VITALS
HEIGHT: 64 IN | RESPIRATION RATE: 17 BRPM | HEART RATE: 76 BPM | SYSTOLIC BLOOD PRESSURE: 152 MMHG | DIASTOLIC BLOOD PRESSURE: 67 MMHG | OXYGEN SATURATION: 100 % | TEMPERATURE: 98 F | WEIGHT: 110.01 LBS

## 2023-07-27 DIAGNOSIS — Z98.890 OTHER SPECIFIED POSTPROCEDURAL STATES: Chronic | ICD-10-CM

## 2023-07-27 DIAGNOSIS — Z90.89 ACQUIRED ABSENCE OF OTHER ORGANS: Chronic | ICD-10-CM

## 2023-07-27 DIAGNOSIS — Z90.710 ACQUIRED ABSENCE OF BOTH CERVIX AND UTERUS: Chronic | ICD-10-CM

## 2023-07-27 DIAGNOSIS — M47.812 SPONDYLOSIS WITHOUT MYELOPATHY OR RADICULOPATHY, CERVICAL REGION: ICD-10-CM

## 2023-07-27 PROCEDURE — 64634 DESTROY C/TH FACET JNT ADDL: CPT | Mod: RT

## 2023-07-27 PROCEDURE — 64633 DESTROY CERV/THOR FACET JNT: CPT | Mod: RT

## 2023-07-27 RX ORDER — OMEPRAZOLE 10 MG/1
1 CAPSULE, DELAYED RELEASE ORAL
Qty: 0 | Refills: 0 | DISCHARGE

## 2023-07-31 PROBLEM — G43.909 MIGRAINE, UNSPECIFIED, NOT INTRACTABLE, WITHOUT STATUS MIGRAINOSUS: Chronic | Status: ACTIVE | Noted: 2023-07-27

## 2023-08-01 ENCOUNTER — TRANSCRIPTION ENCOUNTER (OUTPATIENT)
Age: 58
End: 2023-08-01

## 2023-08-01 NOTE — ASU DISCHARGE PLAN (ADULT/PEDIATRIC) - NS MD DC FALL RISK RISK
For information on Fall & Injury Prevention, visit: https://www.[facility]/news/fall-prevention-protects-and-maintains-health-and-mobility OR  https://www.[facility]/news/fall-prevention-tips-to-avoid-injury OR  https://www.cdc.gov/steadi/patient.html

## 2023-08-01 NOTE — ASU PATIENT PROFILE, ADULT - NSICDXPASTMEDICALHX_GEN_ALL_CORE_FT
PAST MEDICAL HISTORY:  DA (degenerative arthritis)     Fibromyalgia     GERD (gastroesophageal reflux disease)     Heart murmur     Migraine

## 2023-08-03 ENCOUNTER — APPOINTMENT (OUTPATIENT)
Dept: ORTHOPEDIC SURGERY | Facility: HOSPITAL | Age: 58
End: 2023-08-03
Payer: COMMERCIAL

## 2023-08-03 ENCOUNTER — OUTPATIENT (OUTPATIENT)
Dept: OUTPATIENT SERVICES | Facility: HOSPITAL | Age: 58
LOS: 1 days | End: 2023-08-03
Payer: COMMERCIAL

## 2023-08-03 VITALS
DIASTOLIC BLOOD PRESSURE: 80 MMHG | WEIGHT: 110.01 LBS | RESPIRATION RATE: 14 BRPM | TEMPERATURE: 98 F | SYSTOLIC BLOOD PRESSURE: 132 MMHG | HEART RATE: 72 BPM | HEIGHT: 64 IN | OXYGEN SATURATION: 100 %

## 2023-08-03 VITALS
OXYGEN SATURATION: 100 % | HEART RATE: 64 BPM | DIASTOLIC BLOOD PRESSURE: 72 MMHG | SYSTOLIC BLOOD PRESSURE: 113 MMHG | RESPIRATION RATE: 13 BRPM

## 2023-08-03 DIAGNOSIS — Z98.890 OTHER SPECIFIED POSTPROCEDURAL STATES: Chronic | ICD-10-CM

## 2023-08-03 DIAGNOSIS — Z90.89 ACQUIRED ABSENCE OF OTHER ORGANS: Chronic | ICD-10-CM

## 2023-08-03 DIAGNOSIS — Z90.710 ACQUIRED ABSENCE OF BOTH CERVIX AND UTERUS: Chronic | ICD-10-CM

## 2023-08-03 DIAGNOSIS — M47.812 SPONDYLOSIS WITHOUT MYELOPATHY OR RADICULOPATHY, CERVICAL REGION: ICD-10-CM

## 2023-08-03 PROCEDURE — 64634 DESTROY C/TH FACET JNT ADDL: CPT | Mod: 79,LT

## 2023-08-03 PROCEDURE — 64634 DESTROY C/TH FACET JNT ADDL: CPT | Mod: LT

## 2023-08-03 PROCEDURE — 64633 DESTROY CERV/THOR FACET JNT: CPT | Mod: 79,LT

## 2023-08-03 PROCEDURE — 64633 DESTROY CERV/THOR FACET JNT: CPT | Mod: LT

## 2023-08-16 ENCOUNTER — APPOINTMENT (OUTPATIENT)
Dept: PAIN MANAGEMENT | Facility: CLINIC | Age: 58
End: 2023-08-16
Payer: COMMERCIAL

## 2023-08-16 PROCEDURE — 99213 OFFICE O/P EST LOW 20 MIN: CPT | Mod: 95

## 2023-08-16 NOTE — HISTORY OF PRESENT ILLNESS
[Neck] : neck [Gradual] : gradual [7] : 7 [6] : 6 [Dull/Aching] : dull/aching [Radiating] : radiating [Sharp] : sharp [Tightness] : tightness [Constant] : constant [Household chores] : household chores [Work] : work [Rest] : rest [Meds] : meds [Heat] : heat [Injection therapy] : injection therapy [Sitting] : sitting [Standing] : standing [Walking] : walking [Bending forward] : bending forward [Physical therapy] : physical therapy [Not working due to injury] : Work status: not working due to injury [Home] : at home, [unfilled] , at the time of the visit. [Medical Office: ([facility])___] : at the medical office located in  [Verbal consent obtained from patient] : the patient, [unfilled] [] : Post Surgical Visit: no [FreeTextEntry6] : HEADACHES ,PRESSURE  [FreeTextEntry7] : FROM RIGHT SHOULDER UP TO NECK  [FreeTextEntry9] : NSAIDS  [de-identified] : PROLONGED , LIFTING , TURNING HEAD  [de-identified] : STRETCHES , TENDENCY TO GET FROZEN SHOULDER, STRETCHES HELP LOOSEN MUSCLES DAILY

## 2023-09-06 ENCOUNTER — APPOINTMENT (OUTPATIENT)
Dept: PAIN MANAGEMENT | Facility: CLINIC | Age: 58
End: 2023-09-06
Payer: COMMERCIAL

## 2023-09-06 VITALS — BODY MASS INDEX: 19.46 KG/M2 | HEIGHT: 64 IN | WEIGHT: 114 LBS

## 2023-09-06 PROCEDURE — 99213 OFFICE O/P EST LOW 20 MIN: CPT

## 2023-09-06 NOTE — HISTORY OF PRESENT ILLNESS
[Neck] : neck [Gradual] : gradual [7] : 7 [6] : 6 [Dull/Aching] : dull/aching [Radiating] : radiating [Sharp] : sharp [Tightness] : tightness [Constant] : constant [Household chores] : household chores [Work] : work [Rest] : rest [Meds] : meds [Heat] : heat [Injection therapy] : injection therapy [Sitting] : sitting [Standing] : standing [Walking] : walking [Bending forward] : bending forward [Physical therapy] : physical therapy [Not working due to injury] : Work status: not working due to injury [] : Post Surgical Visit: no [FreeTextEntry6] : HEADACHES ,PRESSURE  [FreeTextEntry7] : FROM RIGHT SHOULDER UP TO NECK  [FreeTextEntry9] : NSAIDS  [de-identified] : PROLONGED , LIFTING , TURNING HEAD  [de-identified] : STRETCHES , TENDENCY TO GET FROZEN SHOULDER, STRETCHES HELP LOOSEN MUSCLES DAILY

## 2023-10-04 ENCOUNTER — APPOINTMENT (OUTPATIENT)
Dept: PAIN MANAGEMENT | Facility: CLINIC | Age: 58
End: 2023-10-04
Payer: COMMERCIAL

## 2023-10-04 PROCEDURE — 99213 OFFICE O/P EST LOW 20 MIN: CPT | Mod: 95

## 2023-11-13 ENCOUNTER — APPOINTMENT (OUTPATIENT)
Dept: PAIN MANAGEMENT | Facility: CLINIC | Age: 58
End: 2023-11-13
Payer: COMMERCIAL

## 2023-11-13 PROCEDURE — 99213 OFFICE O/P EST LOW 20 MIN: CPT | Mod: 95

## 2023-12-13 ENCOUNTER — APPOINTMENT (OUTPATIENT)
Dept: PAIN MANAGEMENT | Facility: CLINIC | Age: 58
End: 2023-12-13
Payer: COMMERCIAL

## 2023-12-13 DIAGNOSIS — M54.16 RADICULOPATHY, LUMBAR REGION: ICD-10-CM

## 2023-12-13 PROCEDURE — 99213 OFFICE O/P EST LOW 20 MIN: CPT

## 2024-01-12 ENCOUNTER — APPOINTMENT (OUTPATIENT)
Dept: PAIN MANAGEMENT | Facility: CLINIC | Age: 59
End: 2024-01-12
Payer: COMMERCIAL

## 2024-01-12 PROCEDURE — 99213 OFFICE O/P EST LOW 20 MIN: CPT | Mod: 95

## 2024-01-12 NOTE — REASON FOR VISIT
[Follow-Up Visit] : a follow-up pain management visit [Home] : at home, [unfilled] , at the time of the visit. [Medical Office: ([facility])___] : at the medical office located in  [Patient] : the patient [Self] : self [FreeTextEntry2] : MED REFILL

## 2024-01-12 NOTE — HISTORY OF PRESENT ILLNESS
[Head] : head [Neck] : neck [Lower back] : lower back [Gradual] : gradual [8] : 8 [7] : 7 [Dull/Aching] : dull/aching [Radiating] : radiating [Sharp] : sharp [Tightness] : tightness [Constant] : constant [Household chores] : household chores [Work] : work [Rest] : rest [Meds] : meds [Heat] : heat [Injection therapy] : injection therapy [Sitting] : sitting [Standing] : standing [Walking] : walking [Bending forward] : bending forward [Physical therapy] : physical therapy [Not working due to injury] : Work status: not working due to injury [] : Post Surgical Visit: no [FreeTextEntry1] : WHOLE BODY  [FreeTextEntry6] : HEADACHES ,PRESSURE , SPASMS ON RT SIDE OF BACK  [FreeTextEntry7] : FROM RIGHT SHOULDER UP TO NECK  [FreeTextEntry9] : NSAIDS  [de-identified] : PROLONGED , LIFTING , TURNING HEAD  [de-identified] : STRETCHES , TENDENCY TO GET FROZEN SHOULDER, STRETCHES HELP LOOSEN MUSCLES DAILY

## 2024-01-12 NOTE — DISCUSSION/SUMMARY
[Medication Risks Reviewed] : Medication risks reviewed [de-identified] : Prescriptions renewed. [place]obt[place] chart [place]  reviewed and appropriate. SOAPP-R completed on chart. The patient's medications are documented to the best of their ability. Quality of life and functional ability improved on medications. The patient is showing no aberrant behavior or evidence of diversion. The patient was advised not to use narcotic medication while operating an automobile or heavy machinery due to potential sedation or dizziness. The patient was educated to the risks associated with potential opioid dependence and addiction. Urine toxicology screens as per office protocol. Use of multimodal analgesia used prn. Plan is to request Right Cervical RFL if neck pain persists as will be six months since last procedure.  Follow up one month.

## 2024-01-15 RX ORDER — TRAZODONE HYDROCHLORIDE 100 MG/1
100 TABLET ORAL
Qty: 30 | Refills: 5 | Status: ACTIVE | COMMUNITY
Start: 1900-01-01 | End: 1900-01-01

## 2024-02-01 ENCOUNTER — RX RENEWAL (OUTPATIENT)
Age: 59
End: 2024-02-01

## 2024-02-01 RX ORDER — GABAPENTIN 800 MG/1
800 TABLET, COATED ORAL
Qty: 90 | Refills: 5 | Status: ACTIVE | COMMUNITY
Start: 2023-05-10 | End: 1900-01-01

## 2024-02-09 ENCOUNTER — APPOINTMENT (OUTPATIENT)
Dept: PAIN MANAGEMENT | Facility: CLINIC | Age: 59
End: 2024-02-09
Payer: COMMERCIAL

## 2024-02-09 DIAGNOSIS — M47.817 SPONDYLOSIS W/OUT MYELOPATHY OR RADICULOPATHY, LUMBOSACRAL REGION: ICD-10-CM

## 2024-02-09 PROCEDURE — 99214 OFFICE O/P EST MOD 30 MIN: CPT

## 2024-02-09 RX ORDER — BACLOFEN 10 MG/1
10 TABLET ORAL
Qty: 90 | Refills: 5 | Status: ACTIVE | COMMUNITY
Start: 2023-07-10 | End: 1900-01-01

## 2024-02-09 RX ORDER — NALOXONE HYDROCHLORIDE 4 MG/.1ML
4 SPRAY NASAL
Qty: 1 | Refills: 0 | Status: ACTIVE | COMMUNITY
Start: 2024-02-09 | End: 1900-01-01

## 2024-02-09 NOTE — DISCUSSION/SUMMARY
[Medication Risks Reviewed] : Medication risks reviewed [de-identified] : Prescriptions renewed. [place]/obt[place] and appropriate. SOAPP-R completed on chart. The patient's medications are documented to the best of their ability. Quality of life and functional ability improved on medications. The patient is showing no aberrant behavior or evidence of diversion. The patient was advised not to use narcotic medication while operating an automobile or heavy machinery due to potential sedation or dizziness. The patient was educated to the risks associated with potential opioid dependence and addiction. Urine toxicology screens as per office protocol. Use of multimodal analgesia used prn. Please consider this a formal request for a Left Cervical RFL procedure as the patient has stated a greater than 70% sustained pain relief for the past six months.Spoke to patient about interventional options. Explained risks, benefits and alternatives including but not limited to the risk of infection, bleeding, spinal headache, nerve injury and increased pain. States understanding of above and willing to proceed. Chronic Left axial neck pain Failed conservative therapies including Medical management, Physical therapy and Trigger point injections. ADL and. quality of life compromised due to pain. Cervical MRI confirms facet arthropathy.  Follow up one month.

## 2024-02-09 NOTE — HISTORY OF PRESENT ILLNESS
[Head] : head [Neck] : neck [Lower back] : lower back [Gradual] : gradual [9] : 9 [7] : 7 [Dull/Aching] : dull/aching [Radiating] : radiating [Sharp] : sharp [Tightness] : tightness [Constant] : constant [Household chores] : household chores [Work] : work [Rest] : rest [Meds] : meds [Heat] : heat [Injection therapy] : injection therapy [Sitting] : sitting [Standing] : standing [Walking] : walking [Bending forward] : bending forward [Physical therapy] : physical therapy [Not working due to injury] : Work status: not working due to injury [] : Post Surgical Visit: no [FreeTextEntry1] : WHOLE BODY , RIBS  [FreeTextEntry6] : HEADACHES ,PRESSURE , SPASMS ON RT SIDE OF BACK  [FreeTextEntry7] : FROM RIGHT SHOULDER UP TO NECK  [FreeTextEntry9] : NSAIDS  [de-identified] : PROLONGED , LIFTING , TURNING HEAD  [de-identified] : STRETCHES , TENDENCY TO GET FROZEN SHOULDER, STRETCHES HELP LOOSEN MUSCLES DAILY

## 2024-02-09 NOTE — REASON FOR VISIT
[Follow-Up Visit] : a follow-up pain management visit [Home] : at home, [unfilled] , at the time of the visit. [Medical Office: ([facility])___] : at the medical office located in  [Patient] : the patient [Self] : self [FreeTextEntry2] : MED REFILL

## 2024-02-20 ENCOUNTER — TRANSCRIPTION ENCOUNTER (OUTPATIENT)
Age: 59
End: 2024-02-20

## 2024-02-20 NOTE — ASU DISCHARGE PLAN (ADULT/PEDIATRIC) - NS MD DC FALL RISK RISK
For information on Fall & Injury Prevention, visit: [email or web address]://www.[facility]/[email or web address]-[email or web address]-[email or web address]-[email or web address]-[email or web address]-mobility OR  https://www.[facility]/news/fall-prevention-tips-to-avoid-injury OR  https://www.cdc.gov/steadi/patient.html

## 2024-03-04 ENCOUNTER — OUTPATIENT (OUTPATIENT)
Dept: OUTPATIENT SERVICES | Facility: HOSPITAL | Age: 59
LOS: 1 days | End: 2024-03-04
Payer: COMMERCIAL

## 2024-03-04 ENCOUNTER — APPOINTMENT (OUTPATIENT)
Dept: ORTHOPEDIC SURGERY | Facility: HOSPITAL | Age: 59
End: 2024-03-04
Payer: COMMERCIAL

## 2024-03-04 VITALS
TEMPERATURE: 98 F | HEART RATE: 78 BPM | OXYGEN SATURATION: 100 % | SYSTOLIC BLOOD PRESSURE: 130 MMHG | HEIGHT: 64 IN | DIASTOLIC BLOOD PRESSURE: 84 MMHG | WEIGHT: 115.96 LBS | RESPIRATION RATE: 17 BRPM

## 2024-03-04 VITALS
RESPIRATION RATE: 14 BRPM | OXYGEN SATURATION: 100 % | SYSTOLIC BLOOD PRESSURE: 122 MMHG | HEART RATE: 85 BPM | DIASTOLIC BLOOD PRESSURE: 74 MMHG

## 2024-03-04 DIAGNOSIS — Z90.710 ACQUIRED ABSENCE OF BOTH CERVIX AND UTERUS: Chronic | ICD-10-CM

## 2024-03-04 DIAGNOSIS — M47.812 SPONDYLOSIS WITHOUT MYELOPATHY OR RADICULOPATHY, CERVICAL REGION: ICD-10-CM

## 2024-03-04 DIAGNOSIS — Z98.890 OTHER SPECIFIED POSTPROCEDURAL STATES: Chronic | ICD-10-CM

## 2024-03-04 DIAGNOSIS — Z90.89 ACQUIRED ABSENCE OF OTHER ORGANS: Chronic | ICD-10-CM

## 2024-03-04 PROCEDURE — 64634 DESTROY C/TH FACET JNT ADDL: CPT | Mod: LT

## 2024-03-04 PROCEDURE — 64633 DESTROY CERV/THOR FACET JNT: CPT | Mod: LT

## 2024-03-04 PROCEDURE — 64634 DESTROY C/TH FACET JNT ADDL: CPT

## 2024-03-04 RX ORDER — L.ACIDOPH/B.ANIMALIS/B.LONGUM 15B CELL
1 CAPSULE ORAL
Qty: 0 | Refills: 0 | DISCHARGE

## 2024-03-08 ENCOUNTER — APPOINTMENT (OUTPATIENT)
Dept: PAIN MANAGEMENT | Facility: CLINIC | Age: 59
End: 2024-03-08
Payer: COMMERCIAL

## 2024-03-08 PROCEDURE — 99213 OFFICE O/P EST LOW 20 MIN: CPT

## 2024-03-08 NOTE — DISCUSSION/SUMMARY
[Medication Risks Reviewed] : Medication risks reviewed [de-identified] : Prescriptions renewed. [place] and appropriate. SOAPP-R completed on chart. The patient's medications are documented to the best of their ability. Quality of life and functional ability improved on medications. The patient is showing no aberrant behavior or evidence of diversion. The patient was advised not to use narcotic medication while operating an automobile or heavy machinery due to potential sedation or dizziness. The patient was educated to the risks associated with potential opioid dependence and addiction. Urine toxicology screens as per office protocol. Use of multimodal analgesia used prn. Please consider this a formal request for a RIGHT CERVICAL RFA C3/4, C4/5 and C5/6 procedure as the patient has stated a greater than 60% sustained pain relief for at least three months.  Spoke to patient about interventional options. Explained risks, benefits and alternatives including but not limited to the risk of infection, bleeding, spinal headache, nerve injury and increased pain. States understanding of above and willing to proceed. Follow up one month.

## 2024-03-08 NOTE — HISTORY OF PRESENT ILLNESS
[Head] : head [Neck] : neck [Lower back] : lower back [Gradual] : gradual [6] : 6 [Dull/Aching] : dull/aching [Sharp] : sharp [Constant] : constant [Household chores] : household chores [Work] : work [Rest] : rest [Meds] : meds [Heat] : heat [Injection therapy] : injection therapy [Sitting] : sitting [Standing] : standing [Bending forward] : bending forward [Walking] : walking [Physical therapy] : physical therapy [Not working due to injury] : Work status: not working due to injury [Steroid] : Steroid [] : Post Surgical Visit: no [FreeTextEntry1] : WHOLE BODY , RIBS  [FreeTextEntry9] : NSAIDS  [de-identified] : PROLONGED , LIFTING , TURNING HEAD  [de-identified] : STRETCHES , TENDENCY TO GET FROZEN SHOULDER, STRETCHES HELP LOOSEN MUSCLES DAILY  HEADACHES HAVE SUBSIDED AFTER INJECTION ON 2024-03-04 [de-identified] : 2024-03-04 [de-identified] : CERVICAL LEFT SIDE  [de-identified] : RFL  [TWNoteComboBox1] : 40%

## 2024-03-11 ENCOUNTER — OUTPATIENT (OUTPATIENT)
Dept: OUTPATIENT SERVICES | Facility: HOSPITAL | Age: 59
LOS: 1 days | End: 2024-03-11
Payer: COMMERCIAL

## 2024-03-11 ENCOUNTER — APPOINTMENT (OUTPATIENT)
Dept: ORTHOPEDIC SURGERY | Facility: HOSPITAL | Age: 59
End: 2024-03-11
Payer: COMMERCIAL

## 2024-03-11 ENCOUNTER — TRANSCRIPTION ENCOUNTER (OUTPATIENT)
Age: 59
End: 2024-03-11

## 2024-03-11 VITALS
RESPIRATION RATE: 19 BRPM | SYSTOLIC BLOOD PRESSURE: 111 MMHG | DIASTOLIC BLOOD PRESSURE: 71 MMHG | OXYGEN SATURATION: 99 % | HEART RATE: 62 BPM

## 2024-03-11 VITALS
WEIGHT: 115.96 LBS | DIASTOLIC BLOOD PRESSURE: 77 MMHG | HEIGHT: 64 IN | TEMPERATURE: 98 F | HEART RATE: 73 BPM | RESPIRATION RATE: 11 BRPM | SYSTOLIC BLOOD PRESSURE: 133 MMHG | OXYGEN SATURATION: 100 %

## 2024-03-11 DIAGNOSIS — Z98.890 OTHER SPECIFIED POSTPROCEDURAL STATES: Chronic | ICD-10-CM

## 2024-03-11 DIAGNOSIS — M47.812 SPONDYLOSIS WITHOUT MYELOPATHY OR RADICULOPATHY, CERVICAL REGION: ICD-10-CM

## 2024-03-11 DIAGNOSIS — Z90.89 ACQUIRED ABSENCE OF OTHER ORGANS: Chronic | ICD-10-CM

## 2024-03-11 DIAGNOSIS — Z90.710 ACQUIRED ABSENCE OF BOTH CERVIX AND UTERUS: Chronic | ICD-10-CM

## 2024-03-11 PROCEDURE — 64634 DESTROY C/TH FACET JNT ADDL: CPT | Mod: 79,59,RT

## 2024-03-11 PROCEDURE — 64633 DESTROY CERV/THOR FACET JNT: CPT | Mod: RT

## 2024-03-11 PROCEDURE — 64634 DESTROY C/TH FACET JNT ADDL: CPT | Mod: RT

## 2024-03-11 RX ORDER — DIAZEPAM 5 MG
1 TABLET ORAL
Qty: 0 | Refills: 0 | DISCHARGE

## 2024-03-11 RX ORDER — TRAZODONE HCL 50 MG
1 TABLET ORAL
Qty: 0 | Refills: 0 | DISCHARGE

## 2024-03-11 RX ORDER — BACLOFEN 100 %
1 POWDER (GRAM) MISCELLANEOUS
Qty: 0 | Refills: 0 | DISCHARGE

## 2024-03-11 RX ORDER — GABAPENTIN 400 MG/1
1 CAPSULE ORAL
Qty: 0 | Refills: 0 | DISCHARGE

## 2024-03-11 NOTE — ASU PATIENT PROFILE, ADULT - FALL HARM RISK - UNIVERSAL INTERVENTIONS
Bed in lowest position, wheels locked, appropriate side rails in place/Call bell, personal items and telephone in reach/Instruct patient to call for assistance before getting out of bed or chair/Non-slip footwear when patient is out of bed/[place] to call system/Physically safe environment - no spills, clutter or unnecessary equipment/Purposeful Proactive Rounding/Room/bathroom lighting operational, light cord in reach

## 2024-03-11 NOTE — ASU DISCHARGE PLAN (ADULT/PEDIATRIC) - NS MD DC FALL RISK RISK
For information on Fall & Injury Prevention, visit: [email or web address]://www.[facility]/[email or web address]-[email or web address]-[email or web address]-[email or web address]-mobility OR  https://www.[facility]/[email or web address]-[email or web address]-tips-to-avoid-injury OR  https://www.cdc.gov/steadi/patient.html

## 2024-03-11 NOTE — ASU PREOP CHECKLIST - NEEDLE GAUGE
Sent from pediatrician 
22

## 2024-04-08 ENCOUNTER — APPOINTMENT (OUTPATIENT)
Dept: PAIN MANAGEMENT | Facility: CLINIC | Age: 59
End: 2024-04-08
Payer: COMMERCIAL

## 2024-04-08 PROCEDURE — 99213 OFFICE O/P EST LOW 20 MIN: CPT

## 2024-04-08 NOTE — DISCUSSION/SUMMARY
[Medication Risks Reviewed] : Medication risks reviewed [de-identified] : Prescriptions renewed. [place] and [place]. SOAPP-R completed on chart. The patient's medications are documented to the best of their ability. Quality of life and functional ability improved on medications. The patient is showing no aberrant behavior or evidence of diversion. The patient was advised not to use narcotic medication while operating an automobile or heavy machinery due to potential sedation or dizziness. The patient was educated to the risks associated with potential opioid dependence and addiction. Urine toxicology screens as per office protocol. Use of multimodal analgesia used prn. Follow up one month.dis

## 2024-04-08 NOTE — HISTORY OF PRESENT ILLNESS
[Neck] : neck [Gradual] : gradual [7] : 7 [6] : 6 [Dull/Aching] : dull/aching [Radiating] : radiating [Sharp] : sharp [Tightness] : tightness [Constant] : constant [Household chores] : household chores [Work] : work [Rest] : rest [Meds] : meds [Heat] : heat [Injection therapy] : injection therapy [Sitting] : sitting [Standing] : standing [Walking] : walking [Bending forward] : bending forward [Physical therapy] : physical therapy [Not working due to injury] : Work status: not working due to injury [FreeTextEntry1] : S/P Cervical RFA 3- and 3-. Neck pain and headaches improved. Meds effective prn.  [] : Post Surgical Visit: no [FreeTextEntry6] : HEADACHES ,PRESSURE  [FreeTextEntry7] : FROM RIGHT SHOULDER UP TO NECK  [FreeTextEntry9] : NSAIDS  [de-identified] : PROLONGED , LIFTING , TURNING HEAD  [de-identified] : STRETCHES , TENDENCY TO GET FROZEN SHOULDER, STRETCHES HELP LOOSEN MUSCLES DAILY

## 2024-04-08 NOTE — REASON FOR VISIT
[Follow-Up Visit] : a follow-up pain management visit [Home] : at home, [unfilled] , at the time of the visit. [Medical Office: ([facility])___] : at the medical office located in  [Patient] : the patient [Self] : self

## 2024-05-08 ENCOUNTER — APPOINTMENT (OUTPATIENT)
Dept: PAIN MANAGEMENT | Facility: CLINIC | Age: 59
End: 2024-05-08
Payer: COMMERCIAL

## 2024-05-08 DIAGNOSIS — G89.29 OTHER CHRONIC PAIN: ICD-10-CM

## 2024-05-08 PROCEDURE — 99213 OFFICE O/P EST LOW 20 MIN: CPT

## 2024-05-08 NOTE — HISTORY OF PRESENT ILLNESS
[Neck] : neck [Gradual] : gradual [7] : 7 [6] : 6 [Dull/Aching] : dull/aching [Radiating] : radiating [Sharp] : sharp [Tightness] : tightness [Constant] : constant [Household chores] : household chores [Work] : work [Rest] : rest [Meds] : meds [Heat] : heat [Injection therapy] : injection therapy [Sitting] : sitting [Standing] : standing [Walking] : walking [Bending forward] : bending forward [Physical therapy] : physical therapy [Not working due to injury] : Work status: not working due to injury [FreeTextEntry1] : MARCELLUS KAT IS FOLLOWING UP FOR PAIN MED REFILL, PT ADL'S INCREASE WITH MEDICATION PT IS MORE ACTIVE IN GENERAL AND HAS IMRPOVED QUALITY OF LIFE. PT IS ABLE TO __( TYPE OF ADL )     LAST UDS:  PAIN LEVEL: [] : Post Surgical Visit: no [FreeTextEntry6] : HEADACHES ,PRESSURE  [FreeTextEntry7] : FROM RIGHT SHOULDER UP TO NECK  [FreeTextEntry9] : NSAIDS  [de-identified] : PROLONGED , LIFTING , TURNING HEAD  [de-identified] : STRETCHES , TENDENCY TO GET FROZEN SHOULDER, STRETCHES HELP LOOSEN MUSCLES DAILY

## 2024-05-17 ENCOUNTER — APPOINTMENT (OUTPATIENT)
Dept: ORTHOPEDIC SURGERY | Facility: CLINIC | Age: 59
End: 2024-05-17
Payer: COMMERCIAL

## 2024-05-17 VITALS — WEIGHT: 115 LBS | BODY MASS INDEX: 19.63 KG/M2 | HEIGHT: 64 IN

## 2024-05-17 DIAGNOSIS — Z98.1 ARTHRODESIS STATUS: ICD-10-CM

## 2024-05-17 DIAGNOSIS — M76.891 OTHER SPECIFIED ENTHESOPATHIES OF RIGHT LOWER LIMB, EXCLUDING FOOT: ICD-10-CM

## 2024-05-17 PROCEDURE — ZZZZZ: CPT

## 2024-05-17 NOTE — HISTORY OF PRESENT ILLNESS
[2] : 2 [0] : 0 [Sharp] : sharp [] : yes [Intermittent] : intermittent [Sleep] : sleep [Sitting] : sitting [Standing] : standing [Walking] : walking [de-identified] : 5.17.24 PATIENT HERE FOR RIGHT HIP. PAIN STARTED 6 MONTHS AGO. NO INJUR. PAIN LATERALLY MOSTLY FIRST THING IN MORNING AND WHEN LYING ON THAT SIDE. PAIN RADAITES TO LEG   HX OF LUMBAR SURGERY UNDER CARE OF PAIN FOR BACK WITH ABLATION. NO HIP INJECTIONS. NO GROIN PAIN.  [FreeTextEntry7] : RIGHT HIP TO RIGHT CALF

## 2024-05-17 NOTE — ASSESSMENT
[FreeTextEntry1] : 58 year F WITH MODERATE RT HIP PAIN. H/O LUMBAR FUSION ~25 YEARS AGO. PAIN IS TO THE LATERAL ASPECT OF THE HIP AND RADIATES DOWN THE LE. PAIN WORSENS WITH PROLONGED STANDING AND SITTING TO STAND. PAIN IS AFFECTING FUNCTIONAL ACTIVITIES. THERE IS NO REPRODUCIBLE GROIN PAIN ON PHYSICAL EXAM. XRAYS REVIEWED WITH LUMBAR HARDWARE IN PLACE. TREATMENT OPTIONS REVIEWED. QUESTIONS ANSWERED. PT RX.   WILL ORDER RT HIP MRI TO EVAL FOR ABDUCTOR TEAR.

## 2024-06-07 ENCOUNTER — APPOINTMENT (OUTPATIENT)
Dept: PAIN MANAGEMENT | Facility: CLINIC | Age: 59
End: 2024-06-07
Payer: COMMERCIAL

## 2024-06-07 DIAGNOSIS — M47.812 SPONDYLOSIS W/OUT MYELOPATHY OR RADICULOPATHY, CERVICAL REGION: ICD-10-CM

## 2024-06-07 PROCEDURE — 99213 OFFICE O/P EST LOW 20 MIN: CPT

## 2024-06-07 RX ORDER — DIAZEPAM 5 MG/1
5 TABLET ORAL
Qty: 60 | Refills: 0 | Status: ACTIVE | COMMUNITY
Start: 1900-01-01 | End: 1900-01-01

## 2024-06-07 NOTE — HISTORY OF PRESENT ILLNESS
[Neck] : neck [Gradual] : gradual [7] : 7 [6] : 6 [Dull/Aching] : dull/aching [Radiating] : radiating [Sharp] : sharp [Tightness] : tightness [Constant] : constant [Household chores] : household chores [Work] : work [Rest] : rest [Meds] : meds [Heat] : heat [Injection therapy] : injection therapy [Sitting] : sitting [Standing] : standing [Walking] : walking [Bending forward] : bending forward [Physical therapy] : physical therapy [Not working due to injury] : Work status: not working due to injury [Home] : at home, [unfilled] , at the time of the visit. [Medical Office: ([facility])___] : at the medical office located in  [Verbal consent obtained from patient] : the patient, [unfilled] [FreeTextEntry1] : MARCELLUS KAT  IS FOLLOWING UP FOR PAIN MED REFILL, THERE HAS NOT BEEN CHANGES IN PAIN SINCE LAST VISIT.   LAST UDS: 12/2023  [] : Post Surgical Visit: no [FreeTextEntry6] : HEADACHES ,PRESSURE  [FreeTextEntry7] : FROM RIGHT SHOULDER UP TO NECK  [FreeTextEntry9] : NSAIDS  [de-identified] : PROLONGED , LIFTING , TURNING HEAD  [de-identified] : STRETCHES , TENDENCY TO GET FROZEN SHOULDER, STRETCHES HELP LOOSEN MUSCLES DAILY

## 2024-07-05 ENCOUNTER — APPOINTMENT (OUTPATIENT)
Dept: PAIN MANAGEMENT | Facility: CLINIC | Age: 59
End: 2024-07-05
Payer: COMMERCIAL

## 2024-07-05 DIAGNOSIS — M47.812 SPONDYLOSIS W/OUT MYELOPATHY OR RADICULOPATHY, CERVICAL REGION: ICD-10-CM

## 2024-07-05 PROCEDURE — 99213 OFFICE O/P EST LOW 20 MIN: CPT

## 2024-07-08 ENCOUNTER — RX RENEWAL (OUTPATIENT)
Age: 59
End: 2024-07-08

## 2024-07-23 ENCOUNTER — RX RENEWAL (OUTPATIENT)
Age: 59
End: 2024-07-23

## 2024-08-02 ENCOUNTER — APPOINTMENT (OUTPATIENT)
Dept: PAIN MANAGEMENT | Facility: CLINIC | Age: 59
End: 2024-08-02
Payer: COMMERCIAL

## 2024-08-02 DIAGNOSIS — M54.16 RADICULOPATHY, LUMBAR REGION: ICD-10-CM

## 2024-08-02 PROCEDURE — 99213 OFFICE O/P EST LOW 20 MIN: CPT

## 2024-08-02 NOTE — HISTORY OF PRESENT ILLNESS
[Neck] : neck [Mid-back] : mid-back [Lower back] : lower back [7] : 7 [Burning] : burning [Diffuse] : diffuse [Shooting] : shooting [Stabbing] : stabbing [Throbbing] : throbbing [Frequent] : frequent [Household chores] : household chores [Leisure] : leisure [Work] : work [Sleep] : sleep [Rest] : rest [Meds] : meds [Heat] : heat [Bending forward] : bending forward [Not working due to injury] : Work status: not working due to injury [Home] : at home, [unfilled] , at the time of the visit. [Medical Office: ([facility])___] : at the medical office located in  [Verbal consent obtained from patient] : the patient, [unfilled] [] : no [FreeTextEntry1] : RIGHT HIP [de-identified] : MRI

## 2024-08-30 ENCOUNTER — APPOINTMENT (OUTPATIENT)
Dept: PAIN MANAGEMENT | Facility: CLINIC | Age: 59
End: 2024-08-30
Payer: COMMERCIAL

## 2024-08-30 VITALS — BODY MASS INDEX: 19.63 KG/M2 | WEIGHT: 115 LBS | HEIGHT: 64 IN

## 2024-08-30 DIAGNOSIS — M54.16 RADICULOPATHY, LUMBAR REGION: ICD-10-CM

## 2024-08-30 PROCEDURE — 99213 OFFICE O/P EST LOW 20 MIN: CPT

## 2024-08-30 NOTE — HISTORY OF PRESENT ILLNESS
[Neck] : neck [Mid-back] : mid-back [Lower back] : lower back [6] : 6 [Burning] : burning [Diffuse] : diffuse [Shooting] : shooting [Stabbing] : stabbing [Throbbing] : throbbing [Frequent] : frequent [Household chores] : household chores [Leisure] : leisure [Sleep] : sleep [Work] : work [Rest] : rest [Meds] : meds [Heat] : heat [Bending forward] : bending forward [Not working due to injury] : Work status: not working due to injury [] : no [FreeTextEntry1] : RIGHT HIP [de-identified] : MRI

## 2024-09-18 ENCOUNTER — RX RENEWAL (OUTPATIENT)
Age: 59
End: 2024-09-18

## 2024-09-25 ENCOUNTER — TRANSCRIPTION ENCOUNTER (OUTPATIENT)
Age: 59
End: 2024-09-25

## 2024-09-26 ENCOUNTER — TRANSCRIPTION ENCOUNTER (OUTPATIENT)
Age: 59
End: 2024-09-26

## 2024-09-26 ENCOUNTER — INPATIENT (INPATIENT)
Facility: HOSPITAL | Age: 59
LOS: 0 days | Discharge: HOME | DRG: 904 | End: 2024-09-26
Attending: PLASTIC SURGERY | Admitting: PLASTIC SURGERY
Payer: COMMERCIAL

## 2024-09-26 VITALS
RESPIRATION RATE: 16 BRPM | HEART RATE: 68 BPM | OXYGEN SATURATION: 96 % | SYSTOLIC BLOOD PRESSURE: 113 MMHG | TEMPERATURE: 98 F | DIASTOLIC BLOOD PRESSURE: 72 MMHG

## 2024-09-26 VITALS
OXYGEN SATURATION: 97 % | SYSTOLIC BLOOD PRESSURE: 110 MMHG | HEIGHT: 64 IN | WEIGHT: 111.99 LBS | DIASTOLIC BLOOD PRESSURE: 77 MMHG | TEMPERATURE: 98 F | HEART RATE: 108 BPM | RESPIRATION RATE: 16 BRPM

## 2024-09-26 DIAGNOSIS — Z98.890 OTHER SPECIFIED POSTPROCEDURAL STATES: Chronic | ICD-10-CM

## 2024-09-26 DIAGNOSIS — Z90.89 ACQUIRED ABSENCE OF OTHER ORGANS: Chronic | ICD-10-CM

## 2024-09-26 DIAGNOSIS — Z90.710 ACQUIRED ABSENCE OF BOTH CERVIX AND UTERUS: Chronic | ICD-10-CM

## 2024-09-26 DIAGNOSIS — I96 GANGRENE, NOT ELSEWHERE CLASSIFIED: ICD-10-CM

## 2024-09-26 LAB
ALBUMIN SERPL ELPH-MCNC: 2.9 G/DL — LOW (ref 3.3–5)
ALP SERPL-CCNC: 63 U/L — SIGNIFICANT CHANGE UP (ref 40–120)
ALT FLD-CCNC: 24 U/L — SIGNIFICANT CHANGE UP (ref 10–45)
ANION GAP SERPL CALC-SCNC: 6 MMOL/L — SIGNIFICANT CHANGE UP (ref 5–17)
APTT BLD: 34 SEC — SIGNIFICANT CHANGE UP (ref 24.5–35.6)
AST SERPL-CCNC: 23 U/L — SIGNIFICANT CHANGE UP (ref 10–40)
BASOPHILS # BLD AUTO: 0.06 K/UL — SIGNIFICANT CHANGE UP (ref 0–0.2)
BASOPHILS NFR BLD AUTO: 0.6 % — SIGNIFICANT CHANGE UP (ref 0–2)
BILIRUB SERPL-MCNC: 0.5 MG/DL — SIGNIFICANT CHANGE UP (ref 0.2–1.2)
BLD GP AB SCN SERPL QL: SIGNIFICANT CHANGE UP
BUN SERPL-MCNC: 11 MG/DL — SIGNIFICANT CHANGE UP (ref 7–23)
CALCIUM SERPL-MCNC: 9 MG/DL — SIGNIFICANT CHANGE UP (ref 8.4–10.5)
CHLORIDE SERPL-SCNC: 102 MMOL/L — SIGNIFICANT CHANGE UP (ref 96–108)
CO2 SERPL-SCNC: 31 MMOL/L — SIGNIFICANT CHANGE UP (ref 22–31)
CREAT SERPL-MCNC: 0.6 MG/DL — SIGNIFICANT CHANGE UP (ref 0.5–1.3)
EGFR: 104 ML/MIN/1.73M2 — SIGNIFICANT CHANGE UP
EOSINOPHIL # BLD AUTO: 0.11 K/UL — SIGNIFICANT CHANGE UP (ref 0–0.5)
EOSINOPHIL NFR BLD AUTO: 1.2 % — SIGNIFICANT CHANGE UP (ref 0–6)
GLUCOSE SERPL-MCNC: 92 MG/DL — SIGNIFICANT CHANGE UP (ref 70–99)
HCT VFR BLD CALC: 34 % — LOW (ref 34.5–45)
HGB BLD-MCNC: 10.7 G/DL — LOW (ref 11.5–15.5)
IMM GRANULOCYTES NFR BLD AUTO: 0.3 % — SIGNIFICANT CHANGE UP (ref 0–0.9)
INR BLD: 1.02 RATIO — SIGNIFICANT CHANGE UP (ref 0.85–1.16)
LYMPHOCYTES # BLD AUTO: 1.22 K/UL — SIGNIFICANT CHANGE UP (ref 1–3.3)
LYMPHOCYTES # BLD AUTO: 12.8 % — LOW (ref 13–44)
MCHC RBC-ENTMCNC: 29.4 PG — SIGNIFICANT CHANGE UP (ref 27–34)
MCHC RBC-ENTMCNC: 31.5 GM/DL — LOW (ref 32–36)
MCV RBC AUTO: 93.4 FL — SIGNIFICANT CHANGE UP (ref 80–100)
MONOCYTES # BLD AUTO: 0.59 K/UL — SIGNIFICANT CHANGE UP (ref 0–0.9)
MONOCYTES NFR BLD AUTO: 6.2 % — SIGNIFICANT CHANGE UP (ref 2–14)
NEUTROPHILS # BLD AUTO: 7.51 K/UL — HIGH (ref 1.8–7.4)
NEUTROPHILS NFR BLD AUTO: 78.9 % — HIGH (ref 43–77)
NRBC # BLD: 0 /100 WBCS — SIGNIFICANT CHANGE UP (ref 0–0)
PLATELET # BLD AUTO: 551 K/UL — HIGH (ref 150–400)
POTASSIUM SERPL-MCNC: 3.3 MMOL/L — LOW (ref 3.5–5.3)
POTASSIUM SERPL-SCNC: 3.3 MMOL/L — LOW (ref 3.5–5.3)
PROT SERPL-MCNC: 7.1 G/DL — SIGNIFICANT CHANGE UP (ref 6–8.3)
PROTHROM AB SERPL-ACNC: 12 SEC — SIGNIFICANT CHANGE UP (ref 9.9–13.4)
RBC # BLD: 3.64 M/UL — LOW (ref 3.8–5.2)
RBC # FLD: 13.8 % — SIGNIFICANT CHANGE UP (ref 10.3–14.5)
SODIUM SERPL-SCNC: 139 MMOL/L — SIGNIFICANT CHANGE UP (ref 135–145)
WBC # BLD: 9.52 K/UL — SIGNIFICANT CHANGE UP (ref 3.8–10.5)
WBC # FLD AUTO: 9.52 K/UL — SIGNIFICANT CHANGE UP (ref 3.8–10.5)

## 2024-09-26 PROCEDURE — 86901 BLOOD TYPING SEROLOGIC RH(D): CPT

## 2024-09-26 PROCEDURE — 93010 ELECTROCARDIOGRAM REPORT: CPT

## 2024-09-26 PROCEDURE — 80053 COMPREHEN METABOLIC PANEL: CPT

## 2024-09-26 PROCEDURE — 71045 X-RAY EXAM CHEST 1 VIEW: CPT | Mod: 26

## 2024-09-26 PROCEDURE — 86850 RBC ANTIBODY SCREEN: CPT

## 2024-09-26 PROCEDURE — 96374 THER/PROPH/DIAG INJ IV PUSH: CPT

## 2024-09-26 PROCEDURE — 99285 EMERGENCY DEPT VISIT HI MDM: CPT | Mod: 25

## 2024-09-26 PROCEDURE — C1789: CPT

## 2024-09-26 PROCEDURE — 85610 PROTHROMBIN TIME: CPT

## 2024-09-26 PROCEDURE — 99285 EMERGENCY DEPT VISIT HI MDM: CPT

## 2024-09-26 PROCEDURE — 36415 COLL VENOUS BLD VENIPUNCTURE: CPT

## 2024-09-26 PROCEDURE — 86900 BLOOD TYPING SEROLOGIC ABO: CPT

## 2024-09-26 PROCEDURE — 85025 COMPLETE CBC W/AUTO DIFF WBC: CPT

## 2024-09-26 PROCEDURE — 71045 X-RAY EXAM CHEST 1 VIEW: CPT

## 2024-09-26 PROCEDURE — C1889: CPT

## 2024-09-26 PROCEDURE — 96375 TX/PRO/DX INJ NEW DRUG ADDON: CPT

## 2024-09-26 PROCEDURE — 93005 ELECTROCARDIOGRAM TRACING: CPT

## 2024-09-26 PROCEDURE — 85730 THROMBOPLASTIN TIME PARTIAL: CPT

## 2024-09-26 DEVICE — CLIP APPLIER ETHICON LIGACLIP 11.5" MEDIUM: Type: IMPLANTABLE DEVICE | Site: RIGHT | Status: FUNCTIONAL

## 2024-09-26 DEVICE — XPND TISSUE BREAST SMOOTH MOD VAR PROJ 400CC: Type: IMPLANTABLE DEVICE | Site: RIGHT | Status: FUNCTIONAL

## 2024-09-26 RX ORDER — SODIUM CHLORIDE IRRIG SOLUTION 0.9 %
1000 SOLUTION, IRRIGATION IRRIGATION
Refills: 0 | Status: DISCONTINUED | OUTPATIENT
Start: 2024-09-26 | End: 2024-09-26

## 2024-09-26 RX ORDER — SODIUM CHLORIDE 0.9 % (FLUSH) 0.9 %
500 SYRINGE (ML) INJECTION ONCE
Refills: 0 | Status: COMPLETED | OUTPATIENT
Start: 2024-09-26 | End: 2024-09-26

## 2024-09-26 RX ORDER — GABAPENTIN 800 MG/1
800 TABLET, FILM COATED ORAL THREE TIMES A DAY
Refills: 0 | Status: DISCONTINUED | OUTPATIENT
Start: 2024-09-26 | End: 2024-09-26

## 2024-09-26 RX ORDER — MORPHINE SULFATE 30 MG/1
4 TABLET, FILM COATED, EXTENDED RELEASE ORAL ONCE
Refills: 0 | Status: DISCONTINUED | OUTPATIENT
Start: 2024-09-26 | End: 2024-09-26

## 2024-09-26 RX ORDER — BACLOFEN 100 %
5 POWDER (GRAM) MISCELLANEOUS EVERY 8 HOURS
Refills: 0 | Status: DISCONTINUED | OUTPATIENT
Start: 2024-09-26 | End: 2024-09-26

## 2024-09-26 RX ORDER — POTASSIUM CHLORIDE, SODIUM CHLORIDE, CALCIUM CHLORIDE, SODIUM LACTATE, AND DEXTROSE MONOHYDRATE 1.79; 6; .2; 3.1; 5 G/1000ML; G/1000ML; G/1000ML; G/1000ML; G/1000ML
1000 INJECTION, SOLUTION INTRAVENOUS
Refills: 0 | Status: DISCONTINUED | OUTPATIENT
Start: 2024-09-26 | End: 2024-09-26

## 2024-09-26 RX ORDER — ONDANSETRON HCL/PF 4 MG/2 ML
4 VIAL (ML) INJECTION ONCE
Refills: 0 | Status: COMPLETED | OUTPATIENT
Start: 2024-09-26 | End: 2024-09-26

## 2024-09-26 RX ORDER — HYDROMORPHONE HYDROCHLORIDE 1 MG/ML
0.5 INJECTION, SOLUTION INTRAMUSCULAR; INTRAVENOUS; SUBCUTANEOUS
Refills: 0 | Status: DISCONTINUED | OUTPATIENT
Start: 2024-09-26 | End: 2024-09-26

## 2024-09-26 RX ORDER — ONDANSETRON HCL/PF 4 MG/2 ML
4 VIAL (ML) INJECTION ONCE
Refills: 0 | Status: DISCONTINUED | OUTPATIENT
Start: 2024-09-26 | End: 2024-09-26

## 2024-09-26 RX ORDER — DIAZEPAM 10 MG/1
5 TABLET ORAL
Refills: 0 | Status: DISCONTINUED | OUTPATIENT
Start: 2024-09-26 | End: 2024-09-26

## 2024-09-26 RX ADMIN — MORPHINE SULFATE 4 MILLIGRAM(S): 30 TABLET, FILM COATED, EXTENDED RELEASE ORAL at 11:11

## 2024-09-26 RX ADMIN — Medication 4 MILLIGRAM(S): at 10:54

## 2024-09-26 RX ADMIN — POTASSIUM CHLORIDE, SODIUM CHLORIDE, CALCIUM CHLORIDE, SODIUM LACTATE, AND DEXTROSE MONOHYDRATE 75 MILLILITER(S): 1.79; 6; .2; 3.1; 5 INJECTION, SOLUTION INTRAVENOUS at 12:18

## 2024-09-26 RX ADMIN — Medication 1000 MILLILITER(S): at 10:54

## 2024-09-26 NOTE — DISCHARGE NOTE PROVIDER - NSDCCPCAREPLAN_GEN_ALL_CORE_FT
PRINCIPAL DISCHARGE DIAGNOSIS  Diagnosis: Skin necrosis  Assessment and Plan of Treatment:       SECONDARY DISCHARGE DIAGNOSES  Diagnosis: History of breast cancer  Assessment and Plan of Treatment:

## 2024-09-26 NOTE — DISCHARGE NOTE PROVIDER - CARE PROVIDER_API CALL
Michael Dinh  Plastic Surgery  [address], [address]  [place] [address]  Phone: [phone]  Fax: [phone]  Follow Up Time: 1 week

## 2024-09-26 NOTE — ED ADULT NURSE NOTE - OBJECTIVE STATEMENT
58 yr old female to ED 58 yr old female to ED for breast procedure with Dr. Dinh. Patient states she had procedure done x 2 weeks. Denies fevers, chills, nausea, vomiting.

## 2024-09-26 NOTE — DISCHARGE NOTE PROVIDER - NSDCMRMEDTOKEN_GEN_ALL_CORE_FT
baclofen 10 mg oral tablet: 1 tab(s) orally 3 times a day  diazePAM 5 mg oral tablet: 1 tab(s) orally 2 times a day, As Needed - for muscle spasm  gabapentin 800 mg oral tablet: 1 tab(s) orally 3 times a day  traZODone 100 mg oral tablet: 1 tab(s) orally once a day (at bedtime)

## 2024-09-26 NOTE — DISCHARGE NOTE PROVIDER - NSDCFUADDINST_GEN_ALL_CORE_FT
Please keep the breast incisions dry for 2 days. After 2 days you may take quick showers.  Please keep a daily log of the drain output.

## 2024-09-26 NOTE — DISCHARGE NOTE PROVIDER - NSDCFUSCHEDAPPT_GEN_ALL_CORE_FT
Faisal Calderon  [facility]  [address]  Scheduled Appointment: 09/27/2024

## 2024-09-26 NOTE — H&P ADULT - NSICDXFAMILYHX_GEN_ALL_CORE_FT
FAMILY HISTORY:  Father  Still living? Unknown  Family history of high cholesterol, Age at diagnosis: Age Unknown  Family hx of hypertension, Age at diagnosis: Age Unknown    Mother  Still living? No  Family history of heart failure, Age at diagnosis: Age Unknown    Sibling  Still living? No  Family history of pancreatic cancer, Age at diagnosis: Age Unknown

## 2024-09-26 NOTE — DISCHARGE NOTE PROVIDER - HOSPITAL COURSE
Patient s/p recent bilateral mastectomies with TUG flaps presented to office with right breast flap loss.  Was admitted to  ED this AM and brought to the operating room for exploration of the right reconstructed breast.     Exploration of Right breast with removal of reconstructed breast, placement of tissue expander, left breast reconstruction revision w/ scar revision was performed today.

## 2024-09-26 NOTE — H&P ADULT - SOCIAL HISTORY: TOBACCO USE
Reports use of Nicotine vape every day since 2018. Last used morning of 9/26. History of smoking cigarettes, quit in 2018.

## 2024-09-26 NOTE — H&P ADULT - ASSESSMENT
58 year old female with hx of fibromyalgia, degenerative arthritis of spine , invasive carcinoma of left breast presents S/P Bilateral mastectomies /bilater TUG flap reconstruction with new onset of right flap necrosis .  Patient admitted and scheduled for OR by Dr Dinh for Wash out, possible Revision /possible removal of right flap.

## 2024-09-26 NOTE — ED PROVIDER NOTE - PHYSICAL EXAMINATION
VITAL SIGNS: I have reviewed nursing notes and confirm.  CONSTITUTIONAL: well-appearing, non-toxic, NAD  SKIN: Warm dry, normal skin turgor R breast nipple area skin necrosis 3 x 3 cm area  HEAD: NCAT  CARD: RRR, no murmurs, rubs or gallops  RESP: clear to ausculation b/l.  No rales, rhonchi, or wheezing.  ABD: soft, + BS, non-tender, non-distended, no rebound or guarding.   NEURO: normal motor. normal sensory.  Normal gait.  PSYCH: Cooperative, appropriate.

## 2024-09-26 NOTE — DISCHARGE NOTE NURSING/CASE MANAGEMENT/SOCIAL WORK - PATIENT PORTAL LINK FT
You can access the FollowMyHealth Patient Portal offered by [facility] by registering at the following website: [email or web address]. By joining [employer]’s FollowMyHealth portal, you will also be able to view your health information using other applications (apps) compatible with our system.
No

## 2024-09-26 NOTE — BRIEF OPERATIVE NOTE - OPERATION/FINDINGS
s/p bilateral TUG flaps  exploration of R breast, removal of reconstructed breast, placement of TE  washout    L breast revision recon w/ scar revision

## 2024-09-26 NOTE — H&P ADULT - NSHPPHYSICALEXAM_GEN_ALL_CORE
General: Awake & Orientated x 4. Pt is sitting in bed with no obvious signs of distress. Well groomed. Well nourished.   HEENT:  normocephalic , EOM;s intact ,   Lungs:Pulmonary: bilateral chest rise and fall.   Cardiac: S1 S2   Abd:  soft, + BS , voiding   Breasts:    Surgical incisions bilateral breast and thighs status post Bilateral Mastectomy with Reconstructive TUG flaps  . R breast flap cool  and appears necrotic .,  . Remainder of R breast  with edema and ? fluid    L breast  no edema noted, flap viable, no mastectomy ischemia noted .    Ecchymosis of R shoulder .? bruise     Thigh:  Right and left thigh incisions intact., healing with scabbing along suture line., right and left drains patent .

## 2024-09-26 NOTE — H&P ADULT - NSHPLABSRESULTS_GEN_ALL_CORE
10.7   9.52  )-----------( 551      ( 26 Sep 2024 10:57 )             34.0   09-26    139  |  102  |  11  ----------------------------<  92  3.3[L]   |  31  |  0.60    Ca    9.0      26 Sep 2024 10:57    TPro  7.1  /  Alb  2.9[L]  /  TBili  0.5  /  DBili  x   /  AST  23  /  ALT  24  /  AlkPhos  63  09-26    < from: 12 Lead ECG (09.26.24 @ 10:55) >      Diagnosis Line Normal sinus rhythm  Normal ECG  No previous ECGs available  Confirmed by SUNG CHONG, KOSTAS CLARK ([id number]) on 9/26/2024 12:35:53 PM    < end of copied text >

## 2024-09-26 NOTE — ED PROVIDER NOTE - CLINICAL SUMMARY MEDICAL DECISION MAKING FREE TEXT BOX
58-year-old female presenting to the ED status post breast reconstruction surgery with Dr. Dinh 2 weeks ago with complaints of slight wound drainage and necrosis of skin at right nipple area, proximately 3 x 3 cm area, patient without reported fever chills nausea vomiting, denies any other reported complaints.  Patient was seen by Dr. Dinh in the ED, to go to the OR for revision.  Discussed with Dr. Dinh, will admit to his service, surgical PA to see patient.

## 2024-09-26 NOTE — H&P ADULT - MLM HIDDEN
Anesthesia Evaluation     NPO Solid Status: > 8 hours  NPO Liquid Status: > 8 hours           Airway   Mallampati: III  TM distance: >3 FB  Neck ROM: full  No difficulty expected  Dental - normal exam     Pulmonary - normal exam   Cardiovascular - normal exam        Neuro/Psych  GI/Hepatic/Renal/Endo      Musculoskeletal     Abdominal    Substance History      OB/GYN          Other                        Anesthesia Plan    ASA 3     MAC     intravenous induction   Anesthetic plan, all risks, benefits, and alternatives have been provided, discussed and informed consent has been obtained with: patient.      
yes

## 2024-09-26 NOTE — H&P ADULT - NSHPREVIEWOFSYSTEMS_GEN_ALL_CORE
Reports paresthesias of L 1st, 2nd, and 3rd digits.    Ecchymosis of the R shoulder  hx of fibromyalgia   hx of degenerative spine arthritis   hx of migraines   hx of left lobular ca of left breast

## 2024-09-26 NOTE — ED PROVIDER NOTE - OBJECTIVE STATEMENT
58-year-old female presenting to the ED status post breast reconstruction surgery with Dr. Dinh 2 weeks ago with complaints of slight wound drainage and necrosis of skin at right nipple area, proximately 3 x 3 cm area, patient without reported fever chills nausea vomiting, denies any other reported complaints.  Patient was seen by Dr. Dinh in the ED, to go to the OR for revision.  Discussed with Dr. Dinh, will admit to his service, surgical PA to see patient. Manual Repair Warning Statement: We plan on removing the manually selected variable below in favor of our much easier automatic structured text blocks found in the previous tab. We decided to do this to help make the flow better and give you the full power of structured data. Manual selection is never going to be ideal in our platform and I would encourage you to avoid using manual selection from this point on, especially since I will be sunsetting this feature. It is important that you do one of two things with the customized text below. First, you can save all of the text in a word file so you can have it for future reference. Second, transfer the text to the appropriate area in the Library tab. Lastly, if there is a flap or graft type which we do not have you need to let us know right away so I can add it in before the variable is hidden. No need to panic, we plan to give you roughly 6 months to make the change.

## 2024-09-26 NOTE — H&P ADULT - HISTORY OF PRESENT ILLNESS
Pt is a 58 year old female with a pmhx of fibromyalgia, degenerative arthritis of the spine, and invasive lobular carcinoma of the L breast who presents S/P bilateral mastectomy with TUG reconstruction. Pt reports 5 days post op she experienced drainage and bleeding on her pajamas. She was assured by Home Care Nurse that this was normal and she continued to dress the site. Pt had follow up with Dr. Dinh on 9/25 and was told that R flap had become necrotic. Pt was admitted for breast washout and possible revision.

## 2024-09-26 NOTE — H&P ADULT - NSICDXPASTMEDICALHX_GEN_ALL_CORE_FT
PAST MEDICAL HISTORY:  Breast cancer     DA (degenerative arthritis)     Fibromyalgia     GERD (gastroesophageal reflux disease)     Heart murmur     Migraine

## 2024-09-27 ENCOUNTER — APPOINTMENT (OUTPATIENT)
Dept: PAIN MANAGEMENT | Facility: CLINIC | Age: 59
End: 2024-09-27
Payer: COMMERCIAL

## 2024-09-27 DIAGNOSIS — G89.29 OTHER CHRONIC PAIN: ICD-10-CM

## 2024-09-27 PROBLEM — C50.919 MALIGNANT NEOPLASM OF UNSPECIFIED SITE OF UNSPECIFIED FEMALE BREAST: Chronic | Status: ACTIVE | Noted: 2024-09-26

## 2024-09-27 PROCEDURE — 99213 OFFICE O/P EST LOW 20 MIN: CPT | Mod: 95

## 2024-09-27 NOTE — HISTORY OF PRESENT ILLNESS
[Neck] : neck [Mid-back] : mid-back [Lower back] : lower back [7] : 7 [Burning] : burning [Diffuse] : diffuse [Shooting] : shooting [Stabbing] : stabbing [Throbbing] : throbbing [Frequent] : frequent [Household chores] : household chores [Leisure] : leisure [Work] : work [Sleep] : sleep [Rest] : rest [Meds] : meds [Heat] : heat [Bending forward] : bending forward [Not working due to injury] : Work status: not working due to injury [Home] : at home, [unfilled] , at the time of the visit. [Medical Office: ([facility])___] : at the medical office located in  [Verbal consent obtained from patient] : the patient, [unfilled] [] : no [FreeTextEntry1] : RIGHT HIP [de-identified] : MRI

## 2024-10-25 ENCOUNTER — APPOINTMENT (OUTPATIENT)
Dept: PAIN MANAGEMENT | Facility: CLINIC | Age: 59
End: 2024-10-25
Payer: COMMERCIAL

## 2024-10-25 DIAGNOSIS — M54.16 RADICULOPATHY, LUMBAR REGION: ICD-10-CM

## 2024-10-25 PROCEDURE — 99213 OFFICE O/P EST LOW 20 MIN: CPT

## 2024-11-19 ENCOUNTER — OUTPATIENT (OUTPATIENT)
Dept: OUTPATIENT SERVICES | Facility: HOSPITAL | Age: 59
LOS: 1 days | End: 2024-11-19
Payer: COMMERCIAL

## 2024-11-19 VITALS
TEMPERATURE: 98 F | RESPIRATION RATE: 18 BRPM | SYSTOLIC BLOOD PRESSURE: 112 MMHG | DIASTOLIC BLOOD PRESSURE: 74 MMHG | HEART RATE: 69 BPM | HEIGHT: 65 IN | WEIGHT: 114.2 LBS | OXYGEN SATURATION: 99 %

## 2024-11-19 DIAGNOSIS — Z85.3 PERSONAL HISTORY OF MALIGNANT NEOPLASM OF BREAST: ICD-10-CM

## 2024-11-19 DIAGNOSIS — Z98.890 OTHER SPECIFIED POSTPROCEDURAL STATES: Chronic | ICD-10-CM

## 2024-11-19 DIAGNOSIS — N65.0 DEFORMITY OF RECONSTRUCTED BREAST: ICD-10-CM

## 2024-11-19 DIAGNOSIS — Z90.13 ACQUIRED ABSENCE OF BILATERAL BREASTS AND NIPPLES: Chronic | ICD-10-CM

## 2024-11-19 DIAGNOSIS — Z90.89 ACQUIRED ABSENCE OF OTHER ORGANS: Chronic | ICD-10-CM

## 2024-11-19 DIAGNOSIS — Z01.818 ENCOUNTER FOR OTHER PREPROCEDURAL EXAMINATION: ICD-10-CM

## 2024-11-19 DIAGNOSIS — Z90.13 ACQUIRED ABSENCE OF BILATERAL BREASTS AND NIPPLES: ICD-10-CM

## 2024-11-19 DIAGNOSIS — Z90.710 ACQUIRED ABSENCE OF BOTH CERVIX AND UTERUS: Chronic | ICD-10-CM

## 2024-11-19 LAB — BLD GP AB SCN SERPL QL: SIGNIFICANT CHANGE UP

## 2024-11-19 PROCEDURE — 86850 RBC ANTIBODY SCREEN: CPT

## 2024-11-19 PROCEDURE — 36415 COLL VENOUS BLD VENIPUNCTURE: CPT

## 2024-11-19 PROCEDURE — 86901 BLOOD TYPING SEROLOGIC RH(D): CPT

## 2024-11-19 PROCEDURE — G0463: CPT

## 2024-11-19 PROCEDURE — 86900 BLOOD TYPING SEROLOGIC ABO: CPT

## 2024-11-19 RX ORDER — 0.9 % SODIUM CHLORIDE 0.9 %
1000 INTRAVENOUS SOLUTION INTRAVENOUS
Refills: 0 | Status: DISCONTINUED | OUTPATIENT
Start: 2024-11-25 | End: 2024-12-09

## 2024-11-19 NOTE — H&P PST ADULT - NSICDXPASTSURGICALHX_GEN_ALL_CORE_FT
PAST SURGICAL HISTORY:  H/O bilateral mastectomy     H/O breast reconstruction     H/O Spinal surgery     H/O: hysterectomy partial    History of breast surgery     History of tonsillectomy

## 2024-11-19 NOTE — H&P PST ADULT - HISTORY OF PRESENT ILLNESS
60 y/o female with PMH of chronic back pain, and left breast cancer, S/P bilateral mastectomy with thigh flap complicated with flap failure, and tissue expander needed to be removed on right side - last surgery was 9/26/2024 at [facility], present for pre op evaluation.  Patient reports she has completed and required antibiotics for tissue expander infection and is feeling well with no recent cough, fever or acute illness.  Scheduled for right tissue expander placement with Dr Dinh on 11/25/2024.

## 2024-11-19 NOTE — H&P PST ADULT - PROBLEM SELECTOR PLAN 1
Scheduled for right tissue expander placement with Dr Dinh on 11/25/2024.  Pre op instructions given and patient verbalized understanding.  CBC, CMP and EKG on chart.  T&S pending.  NPO after midnight night before procedure.  May take Gabapentin and baclofen AM of procedure with small sip of water.  To stop all ASA, NSAIDs, vitamins and supplements 1 week prior to procedure.  Chlorhexidene wash given with instructions

## 2024-11-19 NOTE — H&P PST ADULT - NSANTHOSAYNRD_GEN_A_CORE
neck 12.5 inches/No. ROYA screening performed.  STOP BANG Legend: 0-2 = LOW Risk; 3-4 = INTERMEDIATE Risk; 5-8 = HIGH Risk

## 2024-11-19 NOTE — H&P PST ADULT - NSICDXPASTMEDICALHX_GEN_ALL_CORE_FT
PAST MEDICAL HISTORY:  Breast cancer     DA (degenerative arthritis)     Fibromyalgia     GERD (gastroesophageal reflux disease)     Heart murmur     Migraine      PAST MEDICAL HISTORY:  Breast cancer     Current every day nicotine vaping     DA (degenerative arthritis)     Fibromyalgia     GERD (gastroesophageal reflux disease)     Heart murmur     Migraine

## 2024-11-22 ENCOUNTER — APPOINTMENT (OUTPATIENT)
Dept: PAIN MANAGEMENT | Facility: CLINIC | Age: 59
End: 2024-11-22
Payer: COMMERCIAL

## 2024-11-22 DIAGNOSIS — M54.16 RADICULOPATHY, LUMBAR REGION: ICD-10-CM

## 2024-11-22 PROCEDURE — 99213 OFFICE O/P EST LOW 20 MIN: CPT

## 2024-11-25 ENCOUNTER — OUTPATIENT (OUTPATIENT)
Dept: OUTPATIENT SERVICES | Facility: HOSPITAL | Age: 59
LOS: 1 days | End: 2024-11-25
Payer: COMMERCIAL

## 2024-11-25 ENCOUNTER — TRANSCRIPTION ENCOUNTER (OUTPATIENT)
Age: 59
End: 2024-11-25

## 2024-11-25 VITALS
OXYGEN SATURATION: 96 % | RESPIRATION RATE: 16 BRPM | SYSTOLIC BLOOD PRESSURE: 109 MMHG | TEMPERATURE: 97 F | DIASTOLIC BLOOD PRESSURE: 70 MMHG | HEART RATE: 56 BPM

## 2024-11-25 VITALS
WEIGHT: 114.2 LBS | RESPIRATION RATE: 14 BRPM | OXYGEN SATURATION: 98 % | DIASTOLIC BLOOD PRESSURE: 72 MMHG | TEMPERATURE: 99 F | HEART RATE: 82 BPM | SYSTOLIC BLOOD PRESSURE: 119 MMHG | HEIGHT: 65 IN

## 2024-11-25 DIAGNOSIS — Z98.890 OTHER SPECIFIED POSTPROCEDURAL STATES: Chronic | ICD-10-CM

## 2024-11-25 DIAGNOSIS — Z90.13 ACQUIRED ABSENCE OF BILATERAL BREASTS AND NIPPLES: ICD-10-CM

## 2024-11-25 DIAGNOSIS — Z90.89 ACQUIRED ABSENCE OF OTHER ORGANS: Chronic | ICD-10-CM

## 2024-11-25 DIAGNOSIS — Z90.710 ACQUIRED ABSENCE OF BOTH CERVIX AND UTERUS: Chronic | ICD-10-CM

## 2024-11-25 DIAGNOSIS — Z90.13 ACQUIRED ABSENCE OF BILATERAL BREASTS AND NIPPLES: Chronic | ICD-10-CM

## 2024-11-25 DIAGNOSIS — N65.0 DEFORMITY OF RECONSTRUCTED BREAST: ICD-10-CM

## 2024-11-25 DIAGNOSIS — Z01.818 ENCOUNTER FOR OTHER PREPROCEDURAL EXAMINATION: ICD-10-CM

## 2024-11-25 DIAGNOSIS — Z85.3 PERSONAL HISTORY OF MALIGNANT NEOPLASM OF BREAST: ICD-10-CM

## 2024-11-25 PROBLEM — Z72.0 TOBACCO USE: Chronic | Status: ACTIVE | Noted: 2024-11-19

## 2024-11-25 PROCEDURE — C1789: CPT

## 2024-11-25 PROCEDURE — 19357 TISS XPNDR PLMT BRST RCNSTJ: CPT | Mod: RT

## 2024-11-25 DEVICE — LIGATING CLIPS WECK HORIZON LARGE (ORANGE) 6: Type: IMPLANTABLE DEVICE | Site: RIGHT | Status: FUNCTIONAL

## 2024-11-25 DEVICE — IMPLANTABLE DEVICE: Type: IMPLANTABLE DEVICE | Site: RIGHT | Status: FUNCTIONAL

## 2024-11-25 DEVICE — LIGATING CLIPS WECK HORIZON MEDIUM (BLUE) 24: Type: IMPLANTABLE DEVICE | Site: RIGHT | Status: FUNCTIONAL

## 2024-11-25 RX ORDER — ONDANSETRON HYDROCHLORIDE 4 MG/1
4 TABLET, FILM COATED ORAL ONCE
Refills: 0 | Status: DISCONTINUED | OUTPATIENT
Start: 2024-11-25 | End: 2024-11-25

## 2024-11-25 RX ORDER — CYANOCOBALAMIN/FOLIC AC/VIT B6 1-2.2-25MG
0 TABLET ORAL
Refills: 0 | DISCHARGE

## 2024-11-25 RX ORDER — HYDROMORPHONE HYDROCHLORIDE 2 MG/1
0.5 TABLET ORAL
Refills: 0 | Status: DISCONTINUED | OUTPATIENT
Start: 2024-11-25 | End: 2024-11-25

## 2024-11-25 RX ORDER — HYDROMORPHONE HYDROCHLORIDE 2 MG/1
1 TABLET ORAL
Refills: 0 | Status: DISCONTINUED | OUTPATIENT
Start: 2024-11-25 | End: 2024-11-25

## 2024-11-25 RX ORDER — 0.9 % SODIUM CHLORIDE 0.9 %
1000 INTRAVENOUS SOLUTION INTRAVENOUS
Refills: 0 | Status: DISCONTINUED | OUTPATIENT
Start: 2024-11-25 | End: 2024-11-25

## 2024-11-25 RX ADMIN — HYDROMORPHONE HYDROCHLORIDE 0.5 MILLIGRAM(S): 2 TABLET ORAL at 15:56

## 2024-11-25 RX ADMIN — HYDROMORPHONE HYDROCHLORIDE 0.5 MILLIGRAM(S): 2 TABLET ORAL at 16:15

## 2024-11-25 RX ADMIN — Medication 50 MILLILITER(S): at 12:07

## 2024-11-25 NOTE — ASU DISCHARGE PLAN (ADULT/PEDIATRIC) - CARE PROVIDER_API CALL
Michael Dinh  Plastic Surgery  [address], [address]  [place] [address]  Phone: [phone]  Fax: [phone]  Follow Up Time: 1 week

## 2024-11-25 NOTE — BRIEF OPERATIVE NOTE - NSICDXBRIEFPREOP_GEN_ALL_CORE_FT
ENDOTRACHEAL INTUBATION    INDICATION: Life threatening respiratory failure    TIME OUT: taken    SEDATION: etomidate 20 , fentanyl 200 and  Versed 5mg; rocuronium 100 mg    PROCEDURE: Using video laryngoscopy, the vocal cords were well visualized and a 8.0 mm endotracheal tube was place directly through the cords. Good breath sounds auscultated bilaterally without sounds over abdomen. Good return of CO2 on the monitor. CXR is pending. PRE-OP DIAGNOSIS:  History of breast cancer 25-Nov-2024 15:29:24  Surya Mcfarland

## 2024-11-25 NOTE — ASU PREOP CHECKLIST - SITE MARKED BY SURGEON
Re'd fax from [facility] requesting Ibuprofen  400 mg tab and Clotrimazole/Betameth cream 30 gm.    These 2 medications are not on patients medication list on file.    Called [facility] to verify.     Spoke with Duy and he informed writer patient does need a refill on Ibuprofen 400 mg 1 tablet every 4-6 hours prn .    Clotrimazole/Betameth cream 30 gm. He is not taking, and not on his current medication list.     Patient last appt was 1/15/2020  Next appt : none   [FreeTextEntry1] : Hypertension check-up [de-identified] : BCS is a 74 y/o F who is being seen for hypertension check-up. Her BP at the office today is 132/70. Repeat BP is 136/76. Echocardiogram from 9/25/19 was normal. Denies SOB, chest pain, cough, congestions, fever, or weight loss.  yes

## 2024-11-25 NOTE — ASU PATIENT PROFILE, ADULT - NSICDXPASTMEDICALHX_GEN_ALL_CORE_FT
PAST MEDICAL HISTORY:  Breast cancer     Current every day nicotine vaping     DA (degenerative arthritis)     Fibromyalgia     GERD (gastroesophageal reflux disease)     Heart murmur     Migraine

## 2024-11-25 NOTE — ASU DISCHARGE PLAN (ADULT/PEDIATRIC) - ASU DC SPECIAL INSTRUCTIONSFT
1. Please follow up with Dr. Dinh as directed. If you have not already made a follow up appointment, please call the office at [phone].  -  2. Activity:   Please avoid any strenuous activities, AVOID bending, stretching, reaching overhead, or any heavy lifting.  -  3. Dressings:   Some OOZING and blood on the dressing is normal and to be expected. If any concerns or questions please call the office or email a photo and questions to [email or web address] so that a nurse in the office can reach out to you.   -  4. Medications:  Your medications were sent to your pharmacy.  Please take the prescribed medications as directed.   For MILD pain please take regular over the counter pain medications such as: Advil, Tylenol, Motrin.   Only take the narcotic prescribed pain medication for SEVERE PAIN.   If constipation occurs after taking narcotic pain medications, please take an over the counter stool softener.

## 2024-11-25 NOTE — PRE-ANESTHESIA EVALUATION ADULT - NSRADCARDRESULTSFT_GEN_ALL_CORE
EKG: NSR @ 81. No acute ST/T changes.    CXR: NAPD You can access the FollowMyHealth Patient Portal offered by [facility] by registering at the following website: [email or web address]. By joining [facility]’s FollowMyHealth portal, you will also be able to view your health information using other applications (apps) compatible with our system.

## 2024-11-25 NOTE — ASU DISCHARGE PLAN (ADULT/PEDIATRIC) - BATHING
Please keep right breast wound dry until your first post op visit. You can shower waist down./Sponge only/Do not submerge in water

## 2024-11-25 NOTE — ASU DISCHARGE PLAN (ADULT/PEDIATRIC) - DISCHARGE TO
Hypertension is improving with treatment.  Continue current treatment regimen.  Blood pressure will be reassessed in 3 months.   Home

## 2024-11-25 NOTE — ASU DISCHARGE PLAN (ADULT/PEDIATRIC) - FINANCIAL ASSISTANCE
[facility] provides services at a reduced cost to those who are determined to be eligible through [facility]’s financial assistance program. Information regarding [facility]’s financial assistance program can be found by going to [email or web address] or by calling [phone].

## 2024-11-25 NOTE — ASU PREOP CHECKLIST - CHLOROHEXIDINE WASH 1
25-Nov-2024 09:00 conducted a detailed discussion... I had a detailed discussion with the patient and/or guardian regarding the historical points, exam findings, and any diagnostic results supporting the discharge/admit diagnosis.

## 2024-11-25 NOTE — ASU DISCHARGE PLAN (ADULT/PEDIATRIC) - PATIENT EDUCATION MATERIALS PROVIED
tylenol, motrin/Pre-printed instructions given for drain care/Pre-printed instructions given for other (specify)

## 2024-12-08 NOTE — ASU PATIENT PROFILE, ADULT - NS PRO INFO GIVEN TO3
A/P: 63M w/ h/o STEMI at age 47, CAD s/p PCI with 13 stents (Last PCI 11/18/24, ALEX to mLAD), on Plavix and aspirin, GERD, high cholesterol, HTN, presents with complaints of exertional substernal chest pain with exertion.    1. CP/stable angina. Will arrange for transfer to Dr. Camara for [facility] tomorrow AM. Cont IV heparin for 48hrs.   Cont out regimen which includes DAPT, statin. No CP at present.  2Decho pending.   Will move to tele to help facilitate transfer. Pt is stable for transfer.     2. Hypertension. BP well controlled. Cont current regimen.    3. Dyslipidemia. Cont statin for goal LDL of <70.    4. DVT proph. 
patient

## 2024-12-20 ENCOUNTER — APPOINTMENT (OUTPATIENT)
Dept: PAIN MANAGEMENT | Facility: CLINIC | Age: 59
End: 2024-12-20
Payer: COMMERCIAL

## 2024-12-20 DIAGNOSIS — M54.16 RADICULOPATHY, LUMBAR REGION: ICD-10-CM

## 2024-12-20 PROCEDURE — 99213 OFFICE O/P EST LOW 20 MIN: CPT

## 2024-12-24 ENCOUNTER — RX RENEWAL (OUTPATIENT)
Age: 59
End: 2024-12-24

## 2024-12-31 ENCOUNTER — OUTPATIENT (OUTPATIENT)
Dept: OUTPATIENT SERVICES | Facility: HOSPITAL | Age: 59
LOS: 1 days | End: 2024-12-31
Payer: COMMERCIAL

## 2024-12-31 VITALS
RESPIRATION RATE: 18 BRPM | TEMPERATURE: 98 F | DIASTOLIC BLOOD PRESSURE: 81 MMHG | OXYGEN SATURATION: 97 % | SYSTOLIC BLOOD PRESSURE: 113 MMHG | WEIGHT: 116.84 LBS | HEIGHT: 65 IN | HEART RATE: 97 BPM

## 2024-12-31 DIAGNOSIS — Z90.13 ACQUIRED ABSENCE OF BILATERAL BREASTS AND NIPPLES: Chronic | ICD-10-CM

## 2024-12-31 DIAGNOSIS — Z90.13 ACQUIRED ABSENCE OF BILATERAL BREASTS AND NIPPLES: ICD-10-CM

## 2024-12-31 DIAGNOSIS — Z85.3 PERSONAL HISTORY OF MALIGNANT NEOPLASM OF BREAST: ICD-10-CM

## 2024-12-31 DIAGNOSIS — Z90.89 ACQUIRED ABSENCE OF OTHER ORGANS: Chronic | ICD-10-CM

## 2024-12-31 DIAGNOSIS — Z98.890 OTHER SPECIFIED POSTPROCEDURAL STATES: Chronic | ICD-10-CM

## 2024-12-31 DIAGNOSIS — Z01.818 ENCOUNTER FOR OTHER PREPROCEDURAL EXAMINATION: ICD-10-CM

## 2024-12-31 DIAGNOSIS — Z90.710 ACQUIRED ABSENCE OF BOTH CERVIX AND UTERUS: Chronic | ICD-10-CM

## 2024-12-31 DIAGNOSIS — N65.0 DEFORMITY OF RECONSTRUCTED BREAST: ICD-10-CM

## 2024-12-31 LAB
ANION GAP SERPL CALC-SCNC: 10 MMOL/L — SIGNIFICANT CHANGE UP (ref 5–17)
BUN SERPL-MCNC: 12 MG/DL — SIGNIFICANT CHANGE UP (ref 7–23)
CALCIUM SERPL-MCNC: 9.3 MG/DL — SIGNIFICANT CHANGE UP (ref 8.4–10.5)
CHLORIDE SERPL-SCNC: 104 MMOL/L — SIGNIFICANT CHANGE UP (ref 96–108)
CO2 SERPL-SCNC: 27 MMOL/L — SIGNIFICANT CHANGE UP (ref 22–31)
CREAT SERPL-MCNC: 0.6 MG/DL — SIGNIFICANT CHANGE UP (ref 0.5–1.3)
EGFR: 104 ML/MIN/1.73M2 — SIGNIFICANT CHANGE UP
GLUCOSE SERPL-MCNC: 92 MG/DL — SIGNIFICANT CHANGE UP (ref 70–99)
HCT VFR BLD CALC: 36.6 % — SIGNIFICANT CHANGE UP (ref 34.5–45)
HGB BLD-MCNC: 12 G/DL — SIGNIFICANT CHANGE UP (ref 11.5–15.5)
MCHC RBC-ENTMCNC: 27.6 PG — SIGNIFICANT CHANGE UP (ref 27–34)
MCHC RBC-ENTMCNC: 32.8 G/DL — SIGNIFICANT CHANGE UP (ref 32–36)
MCV RBC AUTO: 84.1 FL — SIGNIFICANT CHANGE UP (ref 80–100)
NRBC # BLD: 0 /100 WBCS — SIGNIFICANT CHANGE UP (ref 0–0)
PLATELET # BLD AUTO: 254 K/UL — SIGNIFICANT CHANGE UP (ref 150–400)
POTASSIUM SERPL-MCNC: 3.9 MMOL/L — SIGNIFICANT CHANGE UP (ref 3.5–5.3)
POTASSIUM SERPL-SCNC: 3.9 MMOL/L — SIGNIFICANT CHANGE UP (ref 3.5–5.3)
RBC # BLD: 4.35 M/UL — SIGNIFICANT CHANGE UP (ref 3.8–5.2)
RBC # FLD: 13.9 % — SIGNIFICANT CHANGE UP (ref 10.3–14.5)
SODIUM SERPL-SCNC: 141 MMOL/L — SIGNIFICANT CHANGE UP (ref 135–145)
WBC # BLD: 7.28 K/UL — SIGNIFICANT CHANGE UP (ref 3.8–10.5)
WBC # FLD AUTO: 7.28 K/UL — SIGNIFICANT CHANGE UP (ref 3.8–10.5)

## 2024-12-31 PROCEDURE — 80048 BASIC METABOLIC PNL TOTAL CA: CPT

## 2024-12-31 PROCEDURE — 36415 COLL VENOUS BLD VENIPUNCTURE: CPT

## 2024-12-31 PROCEDURE — G0463: CPT

## 2024-12-31 PROCEDURE — 85027 COMPLETE CBC AUTOMATED: CPT

## 2024-12-31 NOTE — H&P PST ADULT - HISTORY OF PRESENT ILLNESS
60 y/o female with PMH of chronic back pain, and left breast cancer, S/P bilateral mastectomy with thigh flap, complicated with flap failure, and tissue expander needed to be removed on right side - and then was replaced 11/2024 now for follow up procedure.  Feeling well at PST today with no recent cough, fever or acute illness.  Scheduled for right tissue expander exchange for implant with alloderm, left breast revision with implant placement, fat grafting to bilateral breast with Dr Dinh on 01/08/2025

## 2024-12-31 NOTE — H&P PST ADULT - REASON FOR ADMISSION
right tissue expander exchange for implant, left breast implant placement , bilateral breast revision

## 2024-12-31 NOTE — H&P PST ADULT - PROBLEM SELECTOR PLAN 1
Scheduled for right tissue expander exchange for implant with alloderm, left breast revision with implant placement, fat grafting to bilateral breast with Dr Dinh on 01/08/2025  Pre op instructions given and patient verbalized understanding.  CBC, BMP pending.  EKG on chart.  NPO after midnight night before procedure.  To stop all ASA, NSAIDs, vitamins and supplements 1 week prior to procedure.  Chlorhexidene wash given with instructions. Scheduled for right tissue expander exchange for implant with alloderm, left breast revision with implant placement, fat grafting to bilateral breast with Dr Dinh on 01/08/2025  Pre op instructions given and patient verbalized understanding.  CBC, BMP pending.  EKG on chart.  NPO after midnight night before procedure. May take gabapentin and baclofen AM of procedure. To stop all ASA, NSAIDs, vitamins and supplements 1 week prior to procedure.  Chlorhexidene wash given with instructions.

## 2024-12-31 NOTE — H&P PST ADULT - NEGATIVE GASTROINTESTINAL SYMPTOMS
Problem: Falls - Risk of:  Goal: Will remain free from falls  Description  Will remain free from falls  2/10/2020 1152 by Lay Wiggins RN  Outcome: Ongoing  2/10/2020 0256 by Nova Sabillon RN  Outcome: Ongoing  Goal: Absence of physical injury  Description  Absence of physical injury  2/10/2020 1152 by Lay Wiggins RN  Outcome: Ongoing  2/10/2020 0256 by Nova Sabillon RN  Outcome: Ongoing     Problem: Pain Control  Goal: Maintain pain level at or below patient's acceptable level (or 5 if patient is unable to determine acceptable level)  2/10/2020 1152 by Lay Wiggins RN  Outcome: Ongoing  2/10/2020 0256 by Nova Sabillon RN  Outcome: Ongoing  Goal: Improvement in pain related behaviors BP/HR WNL  2/10/2020 1152 by Lay Wiggins RN  Outcome: Ongoing  2/10/2020 0256 by Nova Sabillon RN  Outcome: Ongoing     Problem: GI  Goal: No bowel complications  7/98/2847 1152 by Lay Wiggins RN  Outcome: Ongoing  2/10/2020 0256 by Nova Sabillon RN  Outcome: Ongoing  Goal: Bowel movement at least every other day  2/10/2020 1152 by Lay Wiggins RN  Outcome: Ongoing  2/10/2020 0256 by Nova Sabillon RN  Outcome: Ongoing     Problem:   Goal: Adequate urinary output  2/10/2020 1152 by Lay Wiggins RN  Outcome: Ongoing  2/10/2020 0256 by Nova Sabillon RN  Outcome: Ongoing  Goal: No urinary complication  1/72/8254 [id number] by Lay Wiggins RN  Outcome: Ongoing  2/10/2020 0256 by Nova Sabillon RN  Outcome: Ongoing     Problem: Nutrition  Goal: Optimal nutrition therapy  2/10/2020 1152 by Lay Wiggins RN  Outcome: Ongoing  2/10/2020 0256 by Nova Sabillon RN  Outcome: Ongoing  Goal: Understanding of nutritional guidelines  2/10/2020 1152 by Lay Wiggins RN  Outcome: Ongoing  2/10/2020 0256 by Nova Sabillon RN  Outcome: Ongoing     Problem: Skin Integrity/Risk  Goal: No skin breakdown during hospitalization  2/10/2020 1152 by Lay Wiggins RN  Outcome: Ongoing  2/10/2020 0256 by Nova Sabillon RN  Outcome: Ongoing  Goal: no nausea/no vomiting/no diarrhea/no constipation

## 2025-01-09 NOTE — ASU PATIENT PROFILE, ADULT - NSICDXPASTMEDICALHX_GEN_ALL_CORE_FT
PAST MEDICAL HISTORY:  Breast cancer left    Current every day nicotine vaping     DA (degenerative arthritis)     Fibromyalgia     GERD (gastroesophageal reflux disease)     Heart murmur     Migraine

## 2025-01-09 NOTE — ASU PATIENT PROFILE, ADULT - STREET DRUG/MEDICATION/INHALANT, DURATION OF USE, PROFILE
Spoke to patient's daughter per request. Updated on POC. Daughter requested when patient is discharged to arrange for transport back to patient's apartment at [facility] [address] [place] [address]. She can meet the patient at her apt and assume care. Please call her, Josiane, at [phone].    edibles and smoking for pain management- will decrease and avoid use if able

## 2025-01-09 NOTE — ASU PATIENT PROFILE, ADULT - NSICDXPASTSURGICALHX_GEN_ALL_CORE_FT
PAST SURGICAL HISTORY:  H/O bilateral mastectomy     H/O breast reconstruction expander on right    H/O Spinal surgery L5-S1  titanium cage    H/O: hysterectomy partial    History of breast surgery     History of tonsillectomy

## 2025-01-10 ENCOUNTER — OUTPATIENT (OUTPATIENT)
Dept: OUTPATIENT SERVICES | Facility: HOSPITAL | Age: 60
LOS: 1 days | End: 2025-01-10
Payer: COMMERCIAL

## 2025-01-10 ENCOUNTER — TRANSCRIPTION ENCOUNTER (OUTPATIENT)
Age: 60
End: 2025-01-10

## 2025-01-10 VITALS
RESPIRATION RATE: 15 BRPM | TEMPERATURE: 98 F | DIASTOLIC BLOOD PRESSURE: 70 MMHG | OXYGEN SATURATION: 98 % | SYSTOLIC BLOOD PRESSURE: 125 MMHG

## 2025-01-10 VITALS
HEIGHT: 65 IN | OXYGEN SATURATION: 98 % | WEIGHT: 116.84 LBS | HEART RATE: 75 BPM | RESPIRATION RATE: 16 BRPM | DIASTOLIC BLOOD PRESSURE: 72 MMHG | TEMPERATURE: 98 F | SYSTOLIC BLOOD PRESSURE: 127 MMHG

## 2025-01-10 DIAGNOSIS — N65.0 DEFORMITY OF RECONSTRUCTED BREAST: ICD-10-CM

## 2025-01-10 DIAGNOSIS — Z90.13 ACQUIRED ABSENCE OF BILATERAL BREASTS AND NIPPLES: ICD-10-CM

## 2025-01-10 DIAGNOSIS — Z90.13 ACQUIRED ABSENCE OF BILATERAL BREASTS AND NIPPLES: Chronic | ICD-10-CM

## 2025-01-10 DIAGNOSIS — Z85.3 PERSONAL HISTORY OF MALIGNANT NEOPLASM OF BREAST: ICD-10-CM

## 2025-01-10 DIAGNOSIS — Z98.890 OTHER SPECIFIED POSTPROCEDURAL STATES: Chronic | ICD-10-CM

## 2025-01-10 DIAGNOSIS — Z90.710 ACQUIRED ABSENCE OF BOTH CERVIX AND UTERUS: Chronic | ICD-10-CM

## 2025-01-10 DIAGNOSIS — Z90.89 ACQUIRED ABSENCE OF OTHER ORGANS: Chronic | ICD-10-CM

## 2025-01-10 PROCEDURE — 19342 INSJ/RPLCMT BRST IMPLT SEP D: CPT | Mod: 50

## 2025-01-10 PROCEDURE — 88304 TISSUE EXAM BY PATHOLOGIST: CPT | Mod: 26

## 2025-01-10 PROCEDURE — 15771 GRFG AUTOL FAT LIPO 50 CC/<: CPT

## 2025-01-10 PROCEDURE — 99261: CPT

## 2025-01-10 PROCEDURE — 88304 TISSUE EXAM BY PATHOLOGIST: CPT

## 2025-01-10 PROCEDURE — 15777 ACELLULAR DERM MATRIX IMPLT: CPT

## 2025-01-10 PROCEDURE — 15772 GRFG AUTOL FAT LIPO EA ADDL: CPT

## 2025-01-10 PROCEDURE — C1889: CPT

## 2025-01-10 PROCEDURE — C1789: CPT

## 2025-01-10 DEVICE — IMPLANTABLE DEVICE: Type: IMPLANTABLE DEVICE | Site: RIGHT | Status: FUNCTIONAL

## 2025-01-10 RX ORDER — SODIUM CHLORIDE 9 MG/ML
1000 INJECTION, SOLUTION INTRAVENOUS
Refills: 0 | Status: DISCONTINUED | OUTPATIENT
Start: 2025-01-10 | End: 2025-01-10

## 2025-01-10 RX ORDER — ONDANSETRON 4 MG/1
4 TABLET ORAL ONCE
Refills: 0 | Status: COMPLETED | OUTPATIENT
Start: 2025-01-10 | End: 2025-01-10

## 2025-01-10 RX ORDER — HYDROMORPHONE HCL 4 MG
0.5 TABLET ORAL
Refills: 0 | Status: DISCONTINUED | OUTPATIENT
Start: 2025-01-10 | End: 2025-01-10

## 2025-01-10 RX ORDER — SODIUM CHLORIDE 9 MG/ML
1000 INJECTION, SOLUTION INTRAVENOUS
Refills: 0 | Status: ACTIVE | OUTPATIENT
Start: 2025-01-10 | End: 2025-12-09

## 2025-01-10 RX ORDER — HYDROMORPHONE HCL 4 MG
1 TABLET ORAL ONCE
Refills: 0 | Status: DISCONTINUED | OUTPATIENT
Start: 2025-01-10 | End: 2025-01-10

## 2025-01-10 RX ADMIN — Medication 0.5 MILLIGRAM(S): at 19:30

## 2025-01-10 RX ADMIN — Medication 0.5 MILLIGRAM(S): at 19:08

## 2025-01-10 RX ADMIN — SODIUM CHLORIDE 50 MILLILITER(S): 9 INJECTION, SOLUTION INTRAVENOUS at 13:54

## 2025-01-10 RX ADMIN — ONDANSETRON 4 MILLIGRAM(S): 4 TABLET ORAL at 19:08

## 2025-01-10 NOTE — ASU DISCHARGE PLAN (ADULT/PEDIATRIC) - ASU DC SPECIAL INSTRUCTIONSFT
1. Please follow up with Dr. Dinh as directed. If you have not already made a follow up appointment, please call the office at [phone].  -  2. Activity:   Please avoid any strenuous activities, AVOID bending, stretching, reaching overhead, or any heavy lifting.  -  3. Dressings:   Some OOZING and blood on the dressing is normal and to be expected. If any concerns or questions please call the office or email a photo and questions to [email or web address] so that a nurse in the office can reach out to you.   -  4. Medications:  Your medications were sent to your pharmacy.  Please take the prescribed medications as directed.   For MILD pain please take regular over the counter pain medications such as: Advil, Tylenol, Motrin.   Only take the narcotic prescribed pain medication for SEVERE PAIN.   If constipation occurs after taking narcotic pain medications, please take an over the counter stool softener.

## 2025-01-10 NOTE — ASU DISCHARGE PLAN (ADULT/PEDIATRIC) - PROVIDER TOKENS
Pt eloped from ER. Never saw pt.    Attempted to call pt to recommend return to ED. Please see telephone encounter.  
PROVIDER:[TOKEN:[[id number]],SCHEDULEDAPPT:[01/16/2025]]

## 2025-01-10 NOTE — ASU DISCHARGE PLAN (ADULT/PEDIATRIC) - COMMENTS
Please call the office to make an appt with Dr. Dinh for next week if you don't already have an appt made.

## 2025-01-10 NOTE — ASU DISCHARGE PLAN (ADULT/PEDIATRIC) - FINANCIAL ASSISTANCE
[facility] provides services at a reduced cost to those who are determined to be eligible through [facility]’s financial assistance program. Information regarding [facility]’s financial assistance program can be found by going to [email or web address] or by calling [phone].

## 2025-01-10 NOTE — ASU DISCHARGE PLAN (ADULT/PEDIATRIC) - CARE PROVIDER_API CALL
Michael iDnh  Plastic Surgery  [address], [address]  [place] [address]  Phone: [phone]  Fax: [phone]  Scheduled Appointment: 01/16/2025

## 2025-01-10 NOTE — BRIEF OPERATIVE NOTE - OPERATION/FINDINGS
s/p bilateral TUG flap reconstruction.   Loss of flap on left breast   delayed reconstruction with implant placement   s/p bilateral TUG flap reconstruction.   Loss of flap on RIGHT breast reconstructed today, delayed reconstruction with implant placement  left breast augmentation s/p TUG flap for volume  fat grafting to right breast

## 2025-01-10 NOTE — ASU DISCHARGE PLAN (ADULT/PEDIATRIC) - PROCEDURE
Bilateral breast revision reconstruction with removal TE, placement of implants, fat grafting by Dr. Dinh

## 2025-01-10 NOTE — ASU DISCHARGE PLAN (ADULT/PEDIATRIC) - BATHING
KEEP DRY for 48 hours all surgical sites. After 48 hours you may take a QUICK shower./Do not submerge in water

## 2025-01-16 LAB — SURGICAL PATHOLOGY STUDY: SIGNIFICANT CHANGE UP

## 2025-01-22 ENCOUNTER — APPOINTMENT (OUTPATIENT)
Dept: PAIN MANAGEMENT | Facility: CLINIC | Age: 60
End: 2025-01-22
Payer: COMMERCIAL

## 2025-01-22 DIAGNOSIS — M54.16 RADICULOPATHY, LUMBAR REGION: ICD-10-CM

## 2025-01-22 PROCEDURE — 99213 OFFICE O/P EST LOW 20 MIN: CPT

## 2025-02-24 ENCOUNTER — APPOINTMENT (OUTPATIENT)
Dept: PAIN MANAGEMENT | Facility: CLINIC | Age: 60
End: 2025-02-24
Payer: COMMERCIAL

## 2025-02-24 DIAGNOSIS — M79.7 FIBROMYALGIA: ICD-10-CM

## 2025-02-24 PROCEDURE — 99213 OFFICE O/P EST LOW 20 MIN: CPT | Mod: 95

## 2025-02-28 ENCOUNTER — RX RENEWAL (OUTPATIENT)
Age: 60
End: 2025-02-28

## 2025-03-24 ENCOUNTER — APPOINTMENT (OUTPATIENT)
Dept: PAIN MANAGEMENT | Facility: CLINIC | Age: 60
End: 2025-03-24
Payer: COMMERCIAL

## 2025-03-24 DIAGNOSIS — M54.16 RADICULOPATHY, LUMBAR REGION: ICD-10-CM

## 2025-03-24 PROCEDURE — 99213 OFFICE O/P EST LOW 20 MIN: CPT | Mod: 95

## 2025-04-28 ENCOUNTER — APPOINTMENT (OUTPATIENT)
Dept: PAIN MANAGEMENT | Facility: CLINIC | Age: 60
End: 2025-04-28
Payer: COMMERCIAL

## 2025-04-28 DIAGNOSIS — G89.29 OTHER CHRONIC PAIN: ICD-10-CM

## 2025-04-28 DIAGNOSIS — M54.16 RADICULOPATHY, LUMBAR REGION: ICD-10-CM

## 2025-04-28 PROCEDURE — 99212 OFFICE O/P EST SF 10 MIN: CPT | Mod: 95

## 2025-05-20 ENCOUNTER — APPOINTMENT (OUTPATIENT)
Dept: PAIN MANAGEMENT | Facility: CLINIC | Age: 60
End: 2025-05-20
Payer: COMMERCIAL

## 2025-05-20 DIAGNOSIS — M54.16 RADICULOPATHY, LUMBAR REGION: ICD-10-CM

## 2025-05-20 PROCEDURE — 99213 OFFICE O/P EST LOW 20 MIN: CPT

## 2025-06-02 ENCOUNTER — OUTPATIENT (OUTPATIENT)
Dept: OUTPATIENT SERVICES | Facility: HOSPITAL | Age: 60
LOS: 1 days | End: 2025-06-02
Payer: COMMERCIAL

## 2025-06-02 VITALS
DIASTOLIC BLOOD PRESSURE: 66 MMHG | SYSTOLIC BLOOD PRESSURE: 120 MMHG | RESPIRATION RATE: 16 BRPM | HEART RATE: 96 BPM | HEIGHT: 64 IN | WEIGHT: 114.64 LBS | OXYGEN SATURATION: 99 % | TEMPERATURE: 97 F

## 2025-06-02 DIAGNOSIS — Z98.890 OTHER SPECIFIED POSTPROCEDURAL STATES: Chronic | ICD-10-CM

## 2025-06-02 DIAGNOSIS — Z01.818 ENCOUNTER FOR OTHER PREPROCEDURAL EXAMINATION: ICD-10-CM

## 2025-06-02 DIAGNOSIS — Z90.710 ACQUIRED ABSENCE OF BOTH CERVIX AND UTERUS: Chronic | ICD-10-CM

## 2025-06-02 DIAGNOSIS — Z90.13 ACQUIRED ABSENCE OF BILATERAL BREASTS AND NIPPLES: Chronic | ICD-10-CM

## 2025-06-02 DIAGNOSIS — Z85.3 PERSONAL HISTORY OF MALIGNANT NEOPLASM OF BREAST: ICD-10-CM

## 2025-06-02 DIAGNOSIS — Z90.13 ACQUIRED ABSENCE OF BILATERAL BREASTS AND NIPPLES: ICD-10-CM

## 2025-06-02 DIAGNOSIS — Z90.89 ACQUIRED ABSENCE OF OTHER ORGANS: Chronic | ICD-10-CM

## 2025-06-02 LAB
ANION GAP SERPL CALC-SCNC: 11 MMOL/L — SIGNIFICANT CHANGE UP (ref 5–17)
BUN SERPL-MCNC: 15 MG/DL — SIGNIFICANT CHANGE UP (ref 7–23)
CALCIUM SERPL-MCNC: 9.5 MG/DL — SIGNIFICANT CHANGE UP (ref 8.4–10.5)
CHLORIDE SERPL-SCNC: 100 MMOL/L — SIGNIFICANT CHANGE UP (ref 96–108)
CO2 SERPL-SCNC: 29 MMOL/L — SIGNIFICANT CHANGE UP (ref 22–31)
CREAT SERPL-MCNC: 0.86 MG/DL — SIGNIFICANT CHANGE UP (ref 0.5–1.3)
EGFR: 78 ML/MIN/1.73M2 — SIGNIFICANT CHANGE UP
EGFR: 78 ML/MIN/1.73M2 — SIGNIFICANT CHANGE UP
GLUCOSE SERPL-MCNC: 121 MG/DL — HIGH (ref 70–99)
HCT VFR BLD CALC: 35.7 % — SIGNIFICANT CHANGE UP (ref 34.5–45)
HGB BLD-MCNC: 12.2 G/DL — SIGNIFICANT CHANGE UP (ref 11.5–15.5)
MCHC RBC-ENTMCNC: 29.7 PG — SIGNIFICANT CHANGE UP (ref 27–34)
MCHC RBC-ENTMCNC: 34.2 G/DL — SIGNIFICANT CHANGE UP (ref 32–36)
MCV RBC AUTO: 86.9 FL — SIGNIFICANT CHANGE UP (ref 80–100)
NRBC BLD AUTO-RTO: 0 /100 WBCS — SIGNIFICANT CHANGE UP (ref 0–0)
PLATELET # BLD AUTO: 271 K/UL — SIGNIFICANT CHANGE UP (ref 150–400)
POTASSIUM SERPL-MCNC: 3 MMOL/L — LOW (ref 3.5–5.3)
POTASSIUM SERPL-SCNC: 3 MMOL/L — LOW (ref 3.5–5.3)
RBC # BLD: 4.11 M/UL — SIGNIFICANT CHANGE UP (ref 3.8–5.2)
RBC # FLD: 12.8 % — SIGNIFICANT CHANGE UP (ref 10.3–14.5)
SODIUM SERPL-SCNC: 140 MMOL/L — SIGNIFICANT CHANGE UP (ref 135–145)
WBC # BLD: 10.54 K/UL — HIGH (ref 3.8–10.5)
WBC # FLD AUTO: 10.54 K/UL — HIGH (ref 3.8–10.5)

## 2025-06-02 PROCEDURE — 36415 COLL VENOUS BLD VENIPUNCTURE: CPT

## 2025-06-02 PROCEDURE — 93005 ELECTROCARDIOGRAM TRACING: CPT

## 2025-06-02 PROCEDURE — 85027 COMPLETE CBC AUTOMATED: CPT

## 2025-06-02 PROCEDURE — 80048 BASIC METABOLIC PNL TOTAL CA: CPT

## 2025-06-02 PROCEDURE — G0463: CPT

## 2025-06-02 PROCEDURE — 93010 ELECTROCARDIOGRAM REPORT: CPT | Mod: NC

## 2025-06-02 NOTE — H&P PST ADULT - COMMENTS
Denies h/o or family h/o DVT, PE  Denies bleeding or clotting disorders  Denies dentures/partials, loose teeth/caps per patient

## 2025-06-02 NOTE — H&P PST ADULT - PROBLEM SELECTOR PLAN 1
Pre op instructions given and patient verbalized understanding.  CBC, BMP pending.  EKG done today.  NPO after midnight night before procedure. May take gabapentin and baclofen AM of procedure. To stop all ASA, NSAIDs, vitamins and supplements 1 week prior to procedure.  Chlorhexidene wash given with instructions.

## 2025-06-02 NOTE — H&P PST ADULT - HISTORY OF PRESENT ILLNESS
58 y/o female with PMH of chronic back pain, and left breast cancer, S/P bilateral mastectomy with thigh flap, complicated with flap failure, and tissue expander needed to be removed on right side - and then was replaced 11/2024, additional revision 1/2025,  now for follow up procedure.  Feels well today and denies any acute symptoms.

## 2025-06-05 LAB — POTASSIUM SERPL-SCNC: 4.2 MMOL/L

## 2025-06-05 NOTE — ASU PATIENT PROFILE, ADULT - FALL HARM RISK - UNIVERSAL INTERVENTIONS
Bed in lowest position, wheels locked, appropriate side rails in place/Call bell, personal items and telephone in reach/Instruct patient to call for assistance before getting out of bed or chair/Non-slip footwear when patient is out of bed/[place] to call system/Physically safe environment - no spills, clutter or unnecessary equipment/Purposeful Proactive Rounding/Room/bathroom lighting operational, light cord in reach

## 2025-06-05 NOTE — ASU PATIENT PROFILE, ADULT - NSICDXPASTSURGICALHX_GEN_ALL_CORE_FT
PAST SURGICAL HISTORY:  H/O bilateral mastectomy bilateral implants    H/O breast reconstruction expander on right    H/O Spinal surgery L5-S1  titanium cage currently w/ herniated back and neck    H/O: hysterectomy partial    History of breast surgery     History of tonsillectomy

## 2025-06-06 ENCOUNTER — OUTPATIENT (OUTPATIENT)
Dept: OUTPATIENT SERVICES | Facility: HOSPITAL | Age: 60
LOS: 1 days | End: 2025-06-06
Payer: COMMERCIAL

## 2025-06-06 ENCOUNTER — TRANSCRIPTION ENCOUNTER (OUTPATIENT)
Age: 60
End: 2025-06-06

## 2025-06-06 VITALS
DIASTOLIC BLOOD PRESSURE: 60 MMHG | RESPIRATION RATE: 16 BRPM | TEMPERATURE: 97 F | HEART RATE: 90 BPM | SYSTOLIC BLOOD PRESSURE: 97 MMHG | OXYGEN SATURATION: 95 %

## 2025-06-06 VITALS
RESPIRATION RATE: 16 BRPM | TEMPERATURE: 99 F | HEIGHT: 64 IN | WEIGHT: 114.64 LBS | DIASTOLIC BLOOD PRESSURE: 80 MMHG | OXYGEN SATURATION: 97 % | HEART RATE: 88 BPM | SYSTOLIC BLOOD PRESSURE: 121 MMHG

## 2025-06-06 DIAGNOSIS — Z90.89 ACQUIRED ABSENCE OF OTHER ORGANS: Chronic | ICD-10-CM

## 2025-06-06 DIAGNOSIS — Z90.710 ACQUIRED ABSENCE OF BOTH CERVIX AND UTERUS: Chronic | ICD-10-CM

## 2025-06-06 DIAGNOSIS — Z90.13 ACQUIRED ABSENCE OF BILATERAL BREASTS AND NIPPLES: Chronic | ICD-10-CM

## 2025-06-06 DIAGNOSIS — Z90.13 ACQUIRED ABSENCE OF BILATERAL BREASTS AND NIPPLES: ICD-10-CM

## 2025-06-06 DIAGNOSIS — Z98.890 OTHER SPECIFIED POSTPROCEDURAL STATES: Chronic | ICD-10-CM

## 2025-06-06 DIAGNOSIS — N65.0 DEFORMITY OF RECONSTRUCTED BREAST: ICD-10-CM

## 2025-06-06 DIAGNOSIS — Z85.3 PERSONAL HISTORY OF MALIGNANT NEOPLASM OF BREAST: ICD-10-CM

## 2025-06-06 PROCEDURE — 88302 TISSUE EXAM BY PATHOLOGIST: CPT

## 2025-06-06 PROCEDURE — 19342 INSJ/RPLCMT BRST IMPLT SEP D: CPT | Mod: LT

## 2025-06-06 PROCEDURE — C1789: CPT

## 2025-06-06 PROCEDURE — 15771 GRFG AUTOL FAT LIPO 50 CC/<: CPT

## 2025-06-06 PROCEDURE — 88302 TISSUE EXAM BY PATHOLOGIST: CPT | Mod: 26

## 2025-06-06 PROCEDURE — 15777 ACELLULAR DERM MATRIX IMPLT: CPT

## 2025-06-06 PROCEDURE — 88300 SURGICAL PATH GROSS: CPT | Mod: 26,59

## 2025-06-06 PROCEDURE — 99261: CPT

## 2025-06-06 PROCEDURE — 88300 SURGICAL PATH GROSS: CPT

## 2025-06-06 PROCEDURE — 19380 REVJ RECONSTRUCTED BREAST: CPT | Mod: 50

## 2025-06-06 DEVICE — IMPLANTABLE DEVICE: Type: IMPLANTABLE DEVICE | Site: BILATERAL | Status: FUNCTIONAL

## 2025-06-06 RX ORDER — SODIUM CHLORIDE 9 G/1000ML
1000 INJECTION, SOLUTION INTRAVENOUS
Refills: 0 | Status: ACTIVE | OUTPATIENT
Start: 2025-06-06 | End: 2026-05-05

## 2025-06-06 RX ORDER — SODIUM CHLORIDE 9 G/1000ML
1000 INJECTION, SOLUTION INTRAVENOUS
Refills: 0 | Status: DISCONTINUED | OUTPATIENT
Start: 2025-06-06 | End: 2025-06-06

## 2025-06-06 RX ORDER — OXYCODONE HYDROCHLORIDE 30 MG/1
5 TABLET ORAL ONCE
Refills: 0 | Status: DISCONTINUED | OUTPATIENT
Start: 2025-06-06 | End: 2025-06-06

## 2025-06-06 RX ORDER — ONDANSETRON HCL/PF 4 MG/2 ML
4 VIAL (ML) INJECTION ONCE
Refills: 0 | Status: DISCONTINUED | OUTPATIENT
Start: 2025-06-06 | End: 2025-06-06

## 2025-06-06 RX ORDER — HYDROMORPHONE/SOD CHLOR,ISO/PF 2 MG/10 ML
0.5 SYRINGE (ML) INJECTION
Refills: 0 | Status: DISCONTINUED | OUTPATIENT
Start: 2025-06-06 | End: 2025-06-06

## 2025-06-06 RX ORDER — SCOPOLAMINE 1 MG/3D
1 PATCH, EXTENDED RELEASE TRANSDERMAL ONCE
Refills: 0 | Status: ACTIVE | OUTPATIENT
Start: 2025-06-06

## 2025-06-06 RX ADMIN — Medication 0.5 MILLIGRAM(S): at 15:06

## 2025-06-06 NOTE — ASU DISCHARGE PLAN (ADULT/PEDIATRIC) - NS MD DC FALL RISK RISK
For information on Fall & Injury Prevention, visit: [email or web address] OR  [email or web address] OR  https://www.cdc.gov/steadi/patient.html

## 2025-06-06 NOTE — ASU DISCHARGE PLAN (ADULT/PEDIATRIC) - FINANCIAL ASSISTANCE
[facility] provides services at a reduced cost to those who are determined to be eligible through [facility]’s financial assistance program. Information regarding [facility]’s financial assistance program can be found by going to [email or web address] or by calling [phone].

## 2025-06-06 NOTE — ASU DISCHARGE PLAN (ADULT/PEDIATRIC) - CARE PROVIDER_API CALL
Michael Dinh  Plastic Surgery  [address], [address]  [place] [address]  Phone: [phone]  Fax: [phone]  Follow Up Time: 1 week

## 2025-06-11 LAB — SURGICAL PATHOLOGY STUDY: SIGNIFICANT CHANGE UP

## 2025-06-18 ENCOUNTER — APPOINTMENT (OUTPATIENT)
Dept: PAIN MANAGEMENT | Facility: CLINIC | Age: 60
End: 2025-06-18
Payer: COMMERCIAL

## 2025-06-18 PROCEDURE — 99213 OFFICE O/P EST LOW 20 MIN: CPT | Mod: 95

## 2025-07-18 ENCOUNTER — APPOINTMENT (OUTPATIENT)
Dept: PAIN MANAGEMENT | Facility: CLINIC | Age: 60
End: 2025-07-18
Payer: COMMERCIAL

## 2025-07-18 PROCEDURE — 99212 OFFICE O/P EST SF 10 MIN: CPT | Mod: 95

## 2025-07-25 NOTE — ED ADULT TRIAGE NOTE - IDEAL BODY WEIGHT(KG)
Unsure etiology  Did receive contrast on admission when getting stroke rule out  Very good urine output, 3 L yesterday  Repeat BMP tomorrow    7/26: Creatinine improved to 1.3   55

## 2025-08-18 ENCOUNTER — APPOINTMENT (OUTPATIENT)
Dept: PAIN MANAGEMENT | Facility: CLINIC | Age: 60
End: 2025-08-18
Payer: COMMERCIAL

## 2025-08-18 DIAGNOSIS — M54.16 RADICULOPATHY, LUMBAR REGION: ICD-10-CM

## 2025-08-18 DIAGNOSIS — M79.7 FIBROMYALGIA: ICD-10-CM

## 2025-08-18 PROCEDURE — 99212 OFFICE O/P EST SF 10 MIN: CPT | Mod: 95

## 2025-09-16 ENCOUNTER — APPOINTMENT (OUTPATIENT)
Dept: PAIN MANAGEMENT | Facility: CLINIC | Age: 60
End: 2025-09-16
Payer: COMMERCIAL

## 2025-09-16 VITALS — WEIGHT: 120 LBS | BODY MASS INDEX: 20.49 KG/M2 | HEIGHT: 64 IN

## 2025-09-16 DIAGNOSIS — M54.16 RADICULOPATHY, LUMBAR REGION: ICD-10-CM

## 2025-09-16 PROCEDURE — 99213 OFFICE O/P EST LOW 20 MIN: CPT

## (undated) DEVICE — TUBING ALARIS PUMP MODULE NON-DEHP

## (undated) DEVICE — LAP PAD 18 X 18"

## (undated) DEVICE — ELCTR BOVIE TIP BLADE INSULATED 2.75" EDGE

## (undated) DEVICE — DRSG XEROFORM 5 X 9"

## (undated) DEVICE — VENODYNE/SCD SLEEVE CALF MEDIUM

## (undated) DEVICE — SUT VICRYL 2-0 27" SH UNDYED

## (undated) DEVICE — DRAIN JACKSON PRATT 10MM FLAT FULL NO TROCAR

## (undated) DEVICE — DRSG VAC GRANUFOAM MEDIUM (BLACK)

## (undated) DEVICE — SUT MONOCRYL 4-0 27" PS-2 UNDYED

## (undated) DEVICE — CANISTER W/GEL INFOVAC 1000ML

## (undated) DEVICE — Device

## (undated) DEVICE — SOL IRR POUR H2O 500ML

## (undated) DEVICE — LABELS BLANK W PEN

## (undated) DEVICE — DRAPE 3/4 SHEET 52X76"

## (undated) DEVICE — MARKING PEN W RULER

## (undated) DEVICE — KIT NERVE BLOCK

## (undated) DEVICE — STYLET  ENDOTRACH 7.5MM X 10MM

## (undated) DEVICE — TUBING INFILTRATION

## (undated) DEVICE — PACK IV START WITH CHG

## (undated) DEVICE — GLV 7.5 PROTEXIS (WHITE)

## (undated) DEVICE — SUT ETHILON 5-0 18" P-3

## (undated) DEVICE — GLV 8.5 PROTEXIS (WHITE)

## (undated) DEVICE — PIN DISPENSING SPIKE MINI

## (undated) DEVICE — DRSG MAMMARY SUPPORT LG SIZE 4

## (undated) DEVICE — SPECIMEN TRAP 70ML

## (undated) DEVICE — ABDOMINAL BINDER MED/LG 9" X 36"-64"

## (undated) DEVICE — TUBING LIPOSUCTION HI-VAC PSI-TEC

## (undated) DEVICE — SPECIMEN CONTAINER 100ML

## (undated) DEVICE — GOWN LG

## (undated) DEVICE — TUBING SUCTION 20FT

## (undated) DEVICE — DRAIN RESERVOIR FOR JACKSON PRATT 100CC CARDINAL

## (undated) DEVICE — POSITIONER STRAP ARMBOARD VELCRO TS-30

## (undated) DEVICE — DRSG COMBINE 5 X 9"

## (undated) DEVICE — DRAPE 3/4 SHEET W REINFORCEMENT 56X77"

## (undated) DEVICE — CANNULA STRYKER 50MM 22G 5MM TIP

## (undated) DEVICE — PROTECTOR HEEL / ELBOW FLUFFY

## (undated) DEVICE — DRSG MAMMARY SUPPORT SM SIZE 1

## (undated) DEVICE — PACK MINOR

## (undated) DEVICE — BAG DECANTER DISP

## (undated) DEVICE — SUT MONOCRYL 3-0 18" PS-2 UNDYED

## (undated) DEVICE — DRAPE IOBAN 23" X 23"

## (undated) DEVICE — GLV 6.5 PROTEXIS (WHITE)

## (undated) DEVICE — GOWN LG W TOWEL

## (undated) DEVICE — SUT VICRYL 3-0 18" PS-2 UNDYED

## (undated) DEVICE — STAPLER SKIN VISI-STAT 35 WIDE

## (undated) DEVICE — STOPCOCK 3 WAY W SWIVEL MALE LUER LOCK

## (undated) DEVICE — SYR IV FLUSH SALINE 10ML 30/TY

## (undated) DEVICE — TUBING IV EXTENSION MACRO W CLAVE 7"

## (undated) DEVICE — GLV 8 PROTEXIS (WHITE)

## (undated) DEVICE — SUT SOFSILK 2-0 18" C-23

## (undated) DEVICE — DRAPE INSTRUMENT POUCH 6.75" X 11"

## (undated) DEVICE — DRSG CURITY GAUZE SPONGE 4 X 4" 12-PLY

## (undated) DEVICE — SIZER BREAST GEL SMTH PLUS PROF 390CC

## (undated) DEVICE — GLV 7 PROTEXIS (WHITE)

## (undated) DEVICE — PLT PATIENT SELF ADH NEUTRAL ELECTRD

## (undated) DEVICE — DRSG DERMABOND 0.7ML

## (undated) DEVICE — SOL INJ LR 500ML

## (undated) DEVICE — GLV 8 PROTEXIS

## (undated) DEVICE — CANISTER W/GEL INFOVAC 500ML

## (undated) DEVICE — ELCTR BOVIE BLADE 3/4" EXTENDED LENGTH 6"

## (undated) DEVICE — ELCTR BOVIE PENCIL BLADE 10FT

## (undated) DEVICE — BRA PINK MED 33-36"

## (undated) DEVICE — CATH IV SAFE BC 22G X 1" (BLUE)

## (undated) DEVICE — POSITIONER FOAM EGG CRATE ULNAR 2PCS (PINK)

## (undated) DEVICE — DRSG MAMMARY SUPPORT MED SIZE 2

## (undated) DEVICE — PACK GENERAL MINOR

## (undated) DEVICE — DRAPE LIGHT HANDLE COVER (BLUE)

## (undated) DEVICE — DRAIN JACKSON PRATT 7MM FLAT FULL NO TROCAR

## (undated) DEVICE — DRSG VAC GRANUFOAM SMALL (BLACK)

## (undated) DEVICE — ELCTR GROUNDING PAD ADULT COVIDIEN

## (undated) DEVICE — STRYKER INTERPULSE HANDPIECE W IRR SUCTION TUBE

## (undated) DEVICE — DRAPE TOWEL BLUE 17" X 24"

## (undated) DEVICE — KELLER FUNNEL

## (undated) DEVICE — SYR LUER LOK 50CC

## (undated) DEVICE — DRAPE MAYO STAND 30"

## (undated) DEVICE — KNFE VITREC 20G

## (undated) DEVICE — SOL IRR POUR NS 0.9% 500ML

## (undated) DEVICE — MEDICATION LABELS W MARKER

## (undated) DEVICE — DRSG MASTISOL

## (undated) DEVICE — DRAPE 1/2 SHEET 40X57"

## (undated) DEVICE — SYR LUER LOK 10CC

## (undated) DEVICE — DRSG STERISTRIPS 0.5 X 4"

## (undated) DEVICE — SUT MONOCRYL 4-0 18" PS-2

## (undated) DEVICE — SOL IRR BAG NS 0.9% 3000ML

## (undated) DEVICE — SUT PLAIN GUT FAST ABSORBING 5-0 PC-1

## (undated) DEVICE — CANISTER KCI 500ML GEL SENSA TRAC

## (undated) DEVICE — CANISTER DISPOSABLE THIN WALL 3000CC

## (undated) DEVICE — GOWN TRIMAX LG

## (undated) DEVICE — SUT MONOCRYL 3-0 27" PS-2 UNDYED

## (undated) DEVICE — FUNNEL STRL 6 IND PK

## (undated) DEVICE — VISITEC 4X4

## (undated) DEVICE — SUT TICRON 0 30" GS-21

## (undated) DEVICE — DRSG MAMMARY SUPPORT MED SIZE 3

## (undated) DEVICE — BLADE SURGICAL #15 CARBON

## (undated) DEVICE — DRAPE SPLIT SHEET 77" X 108"

## (undated) DEVICE — FOLEY TRAY 16FR 5CC LTX UMETER CLOSED

## (undated) DEVICE — PACK MAJOR ABDOMINAL SUPINE

## (undated) DEVICE — DRSG VAC GRANUFOAM LARGE (BLACK)

## (undated) DEVICE — VENODYNE/SCD SLEEVE CALF LARGE

## (undated) DEVICE — WARMING BLANKET LOWER ADULT

## (undated) DEVICE — ABDOMINAL BINDER MED/LG 12" X 36"-54"

## (undated) DEVICE — BLADE SCALPEL SAFETYLOCK #15

## (undated) DEVICE — ONETRAC LIGHTED RETRACTOR 135 X 30MM DISP

## (undated) DEVICE — SUT ETHIBOND 0 30" SH

## (undated) DEVICE — WARMING BLANKET FULL UNDERBODY

## (undated) DEVICE — POSITIONER PATIENT SAFETY STRAP 3X60"

## (undated) DEVICE — BLADE SURGICAL #10 STAINLESS

## (undated) DEVICE — WARMING BLANKET UPPER ADULT

## (undated) DEVICE — DRSG STERISTRIPS 0.5X4"

## (undated) DEVICE — DRSG MAMMARY SUPPORT XL SIZE 5

## (undated) DEVICE — DRAPE CHEST BREAST 106" X 122"

## (undated) DEVICE — DRSG XEROFORM 1 X 8"

## (undated) DEVICE — SOL IRR POUR H2O 250ML

## (undated) DEVICE — BLADE SCALPEL SAFETYLOCK #10

## (undated) DEVICE — SUT NYLON 2-0 18" FS

## (undated) DEVICE — GLV 8.5 PROTEXIS